# Patient Record
Sex: FEMALE | Race: WHITE | NOT HISPANIC OR LATINO | Employment: UNEMPLOYED | URBAN - METROPOLITAN AREA
[De-identification: names, ages, dates, MRNs, and addresses within clinical notes are randomized per-mention and may not be internally consistent; named-entity substitution may affect disease eponyms.]

---

## 2017-04-17 RX ORDER — TRAZODONE HYDROCHLORIDE 50 MG/1
50 TABLET ORAL NIGHTLY PRN
Qty: 90 TAB | Refills: 0 | Status: SHIPPED | OUTPATIENT
Start: 2017-04-17 | End: 2021-06-22

## 2017-04-17 NOTE — TELEPHONE ENCOUNTER
LOV 8/16. 3 month supply refilled. Please advise pt to make appt for follow-up and additional fills.

## 2017-06-30 ENCOUNTER — HOSPITAL ENCOUNTER (OUTPATIENT)
Dept: LAB | Facility: MEDICAL CENTER | Age: 52
End: 2017-06-30
Attending: PHYSICIAN ASSISTANT
Payer: OTHER GOVERNMENT

## 2017-06-30 LAB
25(OH)D3 SERPL-MCNC: 46 NG/ML (ref 30–100)
ALBUMIN SERPL BCP-MCNC: 3.8 G/DL (ref 3.2–4.9)
ALBUMIN/GLOB SERPL: 1.5 G/DL
ALP SERPL-CCNC: 56 U/L (ref 30–99)
ALT SERPL-CCNC: 42 U/L (ref 2–50)
ANION GAP SERPL CALC-SCNC: 6 MMOL/L (ref 0–11.9)
APPEARANCE UR: CLEAR
AST SERPL-CCNC: 26 U/L (ref 12–45)
BACTERIA #/AREA URNS HPF: NEGATIVE /HPF
BASOPHILS # BLD AUTO: 1 % (ref 0–1.8)
BASOPHILS # BLD: 0.06 K/UL (ref 0–0.12)
BILIRUB SERPL-MCNC: 0.3 MG/DL (ref 0.1–1.5)
BILIRUB UR QL STRIP.AUTO: NEGATIVE
BUN SERPL-MCNC: 17 MG/DL (ref 8–22)
CALCIUM SERPL-MCNC: 9.2 MG/DL (ref 8.5–10.5)
CHLORIDE SERPL-SCNC: 106 MMOL/L (ref 96–112)
CHOLEST SERPL-MCNC: 164 MG/DL (ref 100–199)
CO2 SERPL-SCNC: 28 MMOL/L (ref 20–33)
COLOR UR: YELLOW
CREAT SERPL-MCNC: 0.71 MG/DL (ref 0.5–1.4)
CULTURE IF INDICATED INDCX: YES UA CULTURE
EOSINOPHIL # BLD AUTO: 0.19 K/UL (ref 0–0.51)
EOSINOPHIL NFR BLD: 3.1 % (ref 0–6.9)
EPI CELLS #/AREA URNS HPF: NORMAL /HPF
ERYTHROCYTE [DISTWIDTH] IN BLOOD BY AUTOMATED COUNT: 48.6 FL (ref 35.9–50)
GFR SERPL CREATININE-BSD FRML MDRD: >60 ML/MIN/1.73 M 2
GLOBULIN SER CALC-MCNC: 2.6 G/DL (ref 1.9–3.5)
GLUCOSE SERPL-MCNC: 95 MG/DL (ref 65–99)
GLUCOSE UR STRIP.AUTO-MCNC: NEGATIVE MG/DL
HCT VFR BLD AUTO: 44.9 % (ref 37–47)
HDLC SERPL-MCNC: 61 MG/DL
HGB BLD-MCNC: 15 G/DL (ref 12–16)
HYALINE CASTS #/AREA URNS LPF: NORMAL /LPF
IMM GRANULOCYTES # BLD AUTO: 0 K/UL (ref 0–0.11)
IMM GRANULOCYTES NFR BLD AUTO: 0 % (ref 0–0.9)
KETONES UR STRIP.AUTO-MCNC: NEGATIVE MG/DL
LDLC SERPL CALC-MCNC: 79 MG/DL
LEUKOCYTE ESTERASE UR QL STRIP.AUTO: ABNORMAL
LYMPHOCYTES # BLD AUTO: 2.95 K/UL (ref 1–4.8)
LYMPHOCYTES NFR BLD: 47.5 % (ref 22–41)
MCH RBC QN AUTO: 33 PG (ref 27–33)
MCHC RBC AUTO-ENTMCNC: 33.4 G/DL (ref 33.6–35)
MCV RBC AUTO: 98.9 FL (ref 81.4–97.8)
MICRO URNS: ABNORMAL
MONOCYTES # BLD AUTO: 0.32 K/UL (ref 0–0.85)
MONOCYTES NFR BLD AUTO: 5.2 % (ref 0–13.4)
MUCOUS THREADS #/AREA URNS HPF: NORMAL /HPF
NEUTROPHILS # BLD AUTO: 2.69 K/UL (ref 2–7.15)
NEUTROPHILS NFR BLD: 43.2 % (ref 44–72)
NITRITE UR QL STRIP.AUTO: NEGATIVE
NRBC # BLD AUTO: 0 K/UL
NRBC BLD AUTO-RTO: 0 /100 WBC
PH UR STRIP.AUTO: 7 [PH]
PLATELET # BLD AUTO: 296 K/UL (ref 164–446)
PMV BLD AUTO: 10.7 FL (ref 9–12.9)
POTASSIUM SERPL-SCNC: 4.3 MMOL/L (ref 3.6–5.5)
PROT SERPL-MCNC: 6.4 G/DL (ref 6–8.2)
PROT UR QL STRIP: NEGATIVE MG/DL
RBC # BLD AUTO: 4.54 M/UL (ref 4.2–5.4)
RBC # URNS HPF: NORMAL /HPF
RBC UR QL AUTO: NEGATIVE
SODIUM SERPL-SCNC: 140 MMOL/L (ref 135–145)
SP GR UR STRIP.AUTO: 1.02
T3FREE SERPL-MCNC: 3.19 PG/ML (ref 2.4–4.2)
T4 FREE SERPL-MCNC: 0.71 NG/DL (ref 0.53–1.43)
TRIGL SERPL-MCNC: 121 MG/DL (ref 0–149)
TSH SERPL DL<=0.005 MIU/L-ACNC: 1.57 UIU/ML (ref 0.3–3.7)
WBC # BLD AUTO: 6.2 K/UL (ref 4.8–10.8)
WBC #/AREA URNS HPF: NORMAL /HPF

## 2017-06-30 PROCEDURE — 80053 COMPREHEN METABOLIC PANEL: CPT

## 2017-06-30 PROCEDURE — 84481 FREE ASSAY (FT-3): CPT

## 2017-06-30 PROCEDURE — 36415 COLL VENOUS BLD VENIPUNCTURE: CPT

## 2017-06-30 PROCEDURE — 81001 URINALYSIS AUTO W/SCOPE: CPT

## 2017-06-30 PROCEDURE — 84443 ASSAY THYROID STIM HORMONE: CPT

## 2017-06-30 PROCEDURE — 80061 LIPID PANEL: CPT

## 2017-06-30 PROCEDURE — 84439 ASSAY OF FREE THYROXINE: CPT

## 2017-06-30 PROCEDURE — 85025 COMPLETE CBC W/AUTO DIFF WBC: CPT

## 2017-06-30 PROCEDURE — 82306 VITAMIN D 25 HYDROXY: CPT

## 2017-06-30 PROCEDURE — 87086 URINE CULTURE/COLONY COUNT: CPT

## 2017-07-02 LAB
BACTERIA UR CULT: NORMAL
SIGNIFICANT IND 70042: NORMAL
SOURCE SOURCE: NORMAL

## 2018-10-17 ENCOUNTER — HOSPITAL ENCOUNTER (OUTPATIENT)
Dept: LAB | Facility: MEDICAL CENTER | Age: 53
End: 2018-10-17
Attending: NURSE PRACTITIONER
Payer: OTHER GOVERNMENT

## 2018-10-17 LAB
25(OH)D3 SERPL-MCNC: 62 NG/ML (ref 30–100)
ALBUMIN SERPL BCP-MCNC: 4.8 G/DL (ref 3.2–4.9)
ALBUMIN/GLOB SERPL: 1.8 G/DL
ALP SERPL-CCNC: 59 U/L (ref 30–99)
ALT SERPL-CCNC: 26 U/L (ref 2–50)
ANION GAP SERPL CALC-SCNC: 4 MMOL/L (ref 0–11.9)
AST SERPL-CCNC: 18 U/L (ref 12–45)
BASOPHILS # BLD AUTO: 0.9 % (ref 0–1.8)
BASOPHILS # BLD: 0.06 K/UL (ref 0–0.12)
BILIRUB SERPL-MCNC: 0.6 MG/DL (ref 0.1–1.5)
BUN SERPL-MCNC: 19 MG/DL (ref 8–22)
CALCIUM SERPL-MCNC: 9.8 MG/DL (ref 8.5–10.5)
CHLORIDE SERPL-SCNC: 103 MMOL/L (ref 96–112)
CHOLEST SERPL-MCNC: 147 MG/DL (ref 100–199)
CO2 SERPL-SCNC: 31 MMOL/L (ref 20–33)
CREAT SERPL-MCNC: 0.76 MG/DL (ref 0.5–1.4)
EOSINOPHIL # BLD AUTO: 0.07 K/UL (ref 0–0.51)
EOSINOPHIL NFR BLD: 1.1 % (ref 0–6.9)
ERYTHROCYTE [DISTWIDTH] IN BLOOD BY AUTOMATED COUNT: 47.3 FL (ref 35.9–50)
FOLATE SERPL-MCNC: >24 NG/ML
GLOBULIN SER CALC-MCNC: 2.7 G/DL (ref 1.9–3.5)
GLUCOSE SERPL-MCNC: 98 MG/DL (ref 65–99)
HCT VFR BLD AUTO: 45.6 % (ref 37–47)
HDLC SERPL-MCNC: 68 MG/DL
HGB BLD-MCNC: 15.6 G/DL (ref 12–16)
IMM GRANULOCYTES # BLD AUTO: 0.02 K/UL (ref 0–0.11)
IMM GRANULOCYTES NFR BLD AUTO: 0.3 % (ref 0–0.9)
IRON SERPL-MCNC: 104 UG/DL (ref 40–170)
LDLC SERPL CALC-MCNC: 63 MG/DL
LYMPHOCYTES # BLD AUTO: 2.07 K/UL (ref 1–4.8)
LYMPHOCYTES NFR BLD: 32.3 % (ref 22–41)
MCH RBC QN AUTO: 33.5 PG (ref 27–33)
MCHC RBC AUTO-ENTMCNC: 34.2 G/DL (ref 33.6–35)
MCV RBC AUTO: 97.9 FL (ref 81.4–97.8)
MONOCYTES # BLD AUTO: 0.35 K/UL (ref 0–0.85)
MONOCYTES NFR BLD AUTO: 5.5 % (ref 0–13.4)
NEUTROPHILS # BLD AUTO: 3.84 K/UL (ref 2–7.15)
NEUTROPHILS NFR BLD: 59.9 % (ref 44–72)
NRBC # BLD AUTO: 0 K/UL
NRBC BLD-RTO: 0 /100 WBC
PLATELET # BLD AUTO: 285 K/UL (ref 164–446)
PMV BLD AUTO: 10.8 FL (ref 9–12.9)
POTASSIUM SERPL-SCNC: 4.8 MMOL/L (ref 3.6–5.5)
PROT SERPL-MCNC: 7.5 G/DL (ref 6–8.2)
RBC # BLD AUTO: 4.66 M/UL (ref 4.2–5.4)
SODIUM SERPL-SCNC: 138 MMOL/L (ref 135–145)
T3FREE SERPL-MCNC: 3.27 PG/ML (ref 2.4–4.2)
T4 FREE SERPL-MCNC: 0.91 NG/DL (ref 0.53–1.43)
TRIGL SERPL-MCNC: 79 MG/DL (ref 0–149)
TSH SERPL DL<=0.005 MIU/L-ACNC: 0.84 UIU/ML (ref 0.38–5.33)
VIT B12 SERPL-MCNC: 1251 PG/ML (ref 211–911)
WBC # BLD AUTO: 6.4 K/UL (ref 4.8–10.8)

## 2018-10-17 PROCEDURE — 82607 VITAMIN B-12: CPT

## 2018-10-17 PROCEDURE — 82746 ASSAY OF FOLIC ACID SERUM: CPT

## 2018-10-17 PROCEDURE — 83540 ASSAY OF IRON: CPT

## 2018-10-17 PROCEDURE — 85025 COMPLETE CBC W/AUTO DIFF WBC: CPT

## 2018-10-17 PROCEDURE — 86800 THYROGLOBULIN ANTIBODY: CPT

## 2018-10-17 PROCEDURE — 84443 ASSAY THYROID STIM HORMONE: CPT

## 2018-10-17 PROCEDURE — 80053 COMPREHEN METABOLIC PANEL: CPT

## 2018-10-17 PROCEDURE — 82306 VITAMIN D 25 HYDROXY: CPT

## 2018-10-17 PROCEDURE — 80061 LIPID PANEL: CPT

## 2018-10-17 PROCEDURE — 84481 FREE ASSAY (FT-3): CPT

## 2018-10-17 PROCEDURE — 84439 ASSAY OF FREE THYROXINE: CPT

## 2018-10-17 PROCEDURE — 36415 COLL VENOUS BLD VENIPUNCTURE: CPT

## 2018-10-18 LAB — THYROGLOB AB SERPL-ACNC: <0.9 IU/ML (ref 0–4)

## 2021-06-15 ENCOUNTER — TELEPHONE (OUTPATIENT)
Dept: SCHEDULING | Facility: IMAGING CENTER | Age: 56
End: 2021-06-15

## 2021-06-16 ENCOUNTER — HOSPITAL ENCOUNTER (OUTPATIENT)
Dept: LAB | Facility: MEDICAL CENTER | Age: 56
End: 2021-06-16
Attending: NURSE PRACTITIONER
Payer: OTHER GOVERNMENT

## 2021-06-16 LAB
25(OH)D3 SERPL-MCNC: 43 NG/ML (ref 30–100)
FOLATE SERPL-MCNC: 26.1 NG/ML
PROGEST SERPL-MCNC: 0.37 NG/ML
VIT B12 SERPL-MCNC: 1068 PG/ML (ref 211–911)

## 2021-06-16 PROCEDURE — 82746 ASSAY OF FOLIC ACID SERUM: CPT

## 2021-06-16 PROCEDURE — 82607 VITAMIN B-12: CPT

## 2021-06-16 PROCEDURE — 84144 ASSAY OF PROGESTERONE: CPT

## 2021-06-16 PROCEDURE — 82670 ASSAY OF TOTAL ESTRADIOL: CPT

## 2021-06-16 PROCEDURE — 36415 COLL VENOUS BLD VENIPUNCTURE: CPT

## 2021-06-16 PROCEDURE — 82306 VITAMIN D 25 HYDROXY: CPT

## 2021-06-18 LAB — ESTRADIOL SERPL-MCNC: 36 PG/ML

## 2021-06-18 SDOH — ECONOMIC STABILITY: TRANSPORTATION INSECURITY
IN THE PAST 12 MONTHS, HAS THE LACK OF TRANSPORTATION KEPT YOU FROM MEDICAL APPOINTMENTS OR FROM GETTING MEDICATIONS?: NO

## 2021-06-18 SDOH — ECONOMIC STABILITY: FOOD INSECURITY: WITHIN THE PAST 12 MONTHS, YOU WORRIED THAT YOUR FOOD WOULD RUN OUT BEFORE YOU GOT MONEY TO BUY MORE.: NEVER TRUE

## 2021-06-18 SDOH — ECONOMIC STABILITY: HOUSING INSECURITY
IN THE LAST 12 MONTHS, WAS THERE A TIME WHEN YOU DID NOT HAVE A STEADY PLACE TO SLEEP OR SLEPT IN A SHELTER (INCLUDING NOW)?: NO

## 2021-06-18 SDOH — ECONOMIC STABILITY: HOUSING INSECURITY: IN THE LAST 12 MONTHS, HOW MANY PLACES HAVE YOU LIVED?: 1

## 2021-06-18 SDOH — ECONOMIC STABILITY: FOOD INSECURITY: WITHIN THE PAST 12 MONTHS, THE FOOD YOU BOUGHT JUST DIDN'T LAST AND YOU DIDN'T HAVE MONEY TO GET MORE.: NEVER TRUE

## 2021-06-18 SDOH — ECONOMIC STABILITY: INCOME INSECURITY: IN THE LAST 12 MONTHS, WAS THERE A TIME WHEN YOU WERE NOT ABLE TO PAY THE MORTGAGE OR RENT ON TIME?: NO

## 2021-06-18 SDOH — ECONOMIC STABILITY: TRANSPORTATION INSECURITY
IN THE PAST 12 MONTHS, HAS LACK OF TRANSPORTATION KEPT YOU FROM MEETINGS, WORK, OR FROM GETTING THINGS NEEDED FOR DAILY LIVING?: NO

## 2021-06-18 SDOH — HEALTH STABILITY: PHYSICAL HEALTH: ON AVERAGE, HOW MANY MINUTES DO YOU ENGAGE IN EXERCISE AT THIS LEVEL?: 30 MIN

## 2021-06-18 SDOH — ECONOMIC STABILITY: TRANSPORTATION INSECURITY
IN THE PAST 12 MONTHS, HAS LACK OF RELIABLE TRANSPORTATION KEPT YOU FROM MEDICAL APPOINTMENTS, MEETINGS, WORK OR FROM GETTING THINGS NEEDED FOR DAILY LIVING?: NO

## 2021-06-18 SDOH — HEALTH STABILITY: PHYSICAL HEALTH: ON AVERAGE, HOW MANY DAYS PER WEEK DO YOU ENGAGE IN MODERATE TO STRENUOUS EXERCISE (LIKE A BRISK WALK)?: 2 DAYS

## 2021-06-18 SDOH — ECONOMIC STABILITY: INCOME INSECURITY: HOW HARD IS IT FOR YOU TO PAY FOR THE VERY BASICS LIKE FOOD, HOUSING, MEDICAL CARE, AND HEATING?: NOT HARD AT ALL

## 2021-06-18 SDOH — HEALTH STABILITY: MENTAL HEALTH
STRESS IS WHEN SOMEONE FEELS TENSE, NERVOUS, ANXIOUS, OR CAN'T SLEEP AT NIGHT BECAUSE THEIR MIND IS TROUBLED. HOW STRESSED ARE YOU?: VERY MUCH

## 2021-06-18 ASSESSMENT — SOCIAL DETERMINANTS OF HEALTH (SDOH)
IN A TYPICAL WEEK, HOW MANY TIMES DO YOU TALK ON THE PHONE WITH FAMILY, FRIENDS, OR NEIGHBORS?: MORE THAN THREE TIMES A WEEK
HOW OFTEN DO YOU ATTEND CHURCH OR RELIGIOUS SERVICES?: NEVER
HOW OFTEN DO YOU HAVE SIX OR MORE DRINKS ON ONE OCCASION: NEVER
HOW OFTEN DO YOU ATTENT MEETINGS OF THE CLUB OR ORGANIZATION YOU BELONG TO?: NEVER
HOW OFTEN DO YOU GET TOGETHER WITH FRIENDS OR RELATIVES?: TWICE A WEEK
IN A TYPICAL WEEK, HOW MANY TIMES DO YOU TALK ON THE PHONE WITH FAMILY, FRIENDS, OR NEIGHBORS?: MORE THAN THREE TIMES A WEEK
DO YOU BELONG TO ANY CLUBS OR ORGANIZATIONS SUCH AS CHURCH GROUPS UNIONS, FRATERNAL OR ATHLETIC GROUPS, OR SCHOOL GROUPS?: NO
DO YOU BELONG TO ANY CLUBS OR ORGANIZATIONS SUCH AS CHURCH GROUPS UNIONS, FRATERNAL OR ATHLETIC GROUPS, OR SCHOOL GROUPS?: NO
WITHIN THE PAST 12 MONTHS, YOU WORRIED THAT YOUR FOOD WOULD RUN OUT BEFORE YOU GOT THE MONEY TO BUY MORE: NEVER TRUE
HOW HARD IS IT FOR YOU TO PAY FOR THE VERY BASICS LIKE FOOD, HOUSING, MEDICAL CARE, AND HEATING?: NOT HARD AT ALL
HOW OFTEN DO YOU ATTENT MEETINGS OF THE CLUB OR ORGANIZATION YOU BELONG TO?: NEVER
HOW OFTEN DO YOU HAVE A DRINK CONTAINING ALCOHOL: NEVER
HOW OFTEN DO YOU ATTEND CHURCH OR RELIGIOUS SERVICES?: NEVER
HOW OFTEN DO YOU GET TOGETHER WITH FRIENDS OR RELATIVES?: TWICE A WEEK

## 2021-06-18 ASSESSMENT — LIFESTYLE VARIABLES
HOW OFTEN DO YOU HAVE A DRINK CONTAINING ALCOHOL: NEVER
HOW OFTEN DO YOU HAVE SIX OR MORE DRINKS ON ONE OCCASION: NEVER

## 2021-06-22 ENCOUNTER — OFFICE VISIT (OUTPATIENT)
Dept: MEDICAL GROUP | Facility: PHYSICIAN GROUP | Age: 56
End: 2021-06-22
Payer: OTHER GOVERNMENT

## 2021-06-22 VITALS
HEIGHT: 66 IN | TEMPERATURE: 98.8 F | SYSTOLIC BLOOD PRESSURE: 150 MMHG | BODY MASS INDEX: 28.28 KG/M2 | OXYGEN SATURATION: 98 % | WEIGHT: 176 LBS | RESPIRATION RATE: 18 BRPM | DIASTOLIC BLOOD PRESSURE: 90 MMHG | HEART RATE: 88 BPM

## 2021-06-22 DIAGNOSIS — Z11.59 NEED FOR HEPATITIS C SCREENING TEST: ICD-10-CM

## 2021-06-22 DIAGNOSIS — F41.9 ANXIETY DISORDER, UNSPECIFIED TYPE: ICD-10-CM

## 2021-06-22 DIAGNOSIS — G43.019 INTRACTABLE MIGRAINE WITHOUT AURA AND WITHOUT STATUS MIGRAINOSUS: ICD-10-CM

## 2021-06-22 DIAGNOSIS — G89.4 CHRONIC PAIN SYNDROME: ICD-10-CM

## 2021-06-22 DIAGNOSIS — L40.9 PSORIASIS: ICD-10-CM

## 2021-06-22 DIAGNOSIS — Z00.00 WELLNESS EXAMINATION: ICD-10-CM

## 2021-06-22 DIAGNOSIS — Z12.83 SKIN CANCER SCREENING: ICD-10-CM

## 2021-06-22 DIAGNOSIS — E78.00 HIGH CHOLESTEROL: ICD-10-CM

## 2021-06-22 PROCEDURE — 99204 OFFICE O/P NEW MOD 45 MIN: CPT | Performed by: NURSE PRACTITIONER

## 2021-06-22 RX ORDER — ALPRAZOLAM 0.5 MG/1
0.5 TABLET ORAL NIGHTLY PRN
COMMUNITY
End: 2021-06-22 | Stop reason: SDUPTHER

## 2021-06-22 RX ORDER — ACYCLOVIR 800 MG/1
TABLET ORAL
COMMUNITY
Start: 2021-06-15 | End: 2021-12-30 | Stop reason: SDUPTHER

## 2021-06-22 RX ORDER — FREMANEZUMAB-VFRM 225 MG/1.5ML
225 INJECTION SUBCUTANEOUS ONCE
COMMUNITY
End: 2021-07-06

## 2021-06-22 RX ORDER — ALPRAZOLAM 0.5 MG/1
0.5 TABLET ORAL NIGHTLY PRN
Qty: 30 TABLET | Refills: 0 | Status: SHIPPED | OUTPATIENT
Start: 2021-06-22 | End: 2021-07-22

## 2021-06-22 RX ORDER — ESTRADIOL 0.1 MG/D
FILM, EXTENDED RELEASE TRANSDERMAL
COMMUNITY
Start: 2021-04-21 | End: 2021-07-06

## 2021-06-22 RX ORDER — BUPRENORPHINE HYDROCHLORIDE 600 UG/1
900 FILM, SOLUBLE BUCCAL
COMMUNITY
End: 2024-02-22

## 2021-06-22 RX ORDER — CYCLOBENZAPRINE HCL 10 MG
TABLET ORAL
COMMUNITY
Start: 2021-05-03 | End: 2023-11-01

## 2021-06-22 ASSESSMENT — PATIENT HEALTH QUESTIONNAIRE - PHQ9: CLINICAL INTERPRETATION OF PHQ2 SCORE: 0

## 2021-06-22 NOTE — ASSESSMENT & PLAN NOTE
"Chronic, stable.  Currently taking Crestor 10 mg daily as directed.  Denies side effects of the medication--no myalgias or abdominal pain.  Reports diet is \"okay\".   She is following a low-cholesterol diet.  She is exercising regularly.  She is not taking ASA daily.  She denies dizziness, claudication, or chest pain.  Due for updated lab work.  "
Chronic and ongoing. She has Fluocinonide cream to help as needed. She mostly gets flare up on her hands/palms.  
Chronic and ongoing. She is currently taking Ajovy injections once a month. She states that it has been helping. She would like to see a Neurologist.  
Chronic and ongoing. She is taking Alprazolam 0.5 mg nightly as needed. She states that she has been off of medication for a little while. She states that she has noticed her anxiety increasing. She is requesting a refill today.  
Chronic and ongoing. She states that she gets pain all over. She has joint pain and migraines. She is currently taking Oxycodone 10 mg every 6 hours as needed. She states that the medication is helping some. She does see a pain specialist that gives her this prescription.  
She would like a referral to Dermatology for her annual skin cancer screening.  
-Please follow-up with your pediatrician in one to two days after discharge.  -Continue with previously prescribed amoxicillin as noted by private pediatrician.  -Continue with azithromycin initiated in hospital as prescribed.  -Please call your pediatrician sooner, or come to the ED if symptoms do not respond to medication or worsen, as well as for any concerns.

## 2021-06-22 NOTE — PROGRESS NOTES
"Subjective  Chief Complaint  Establish care to manage her chronic conditions    History of Present Illness  Christelle Kennedy is a 56 y.o. female. This patient is here today to establish care.    Anxiety disorder, unspecified  Chronic and ongoing. She is taking Alprazolam 0.5 mg nightly as needed. She states that she has been off of medication for a little while. She states that she has noticed her anxiety increasing. She is requesting a refill today.    Chronic pain syndrome  Chronic and ongoing. She states that she gets pain all over. She has joint pain and migraines. She is currently taking Oxycodone 10 mg every 6 hours as needed. She states that the medication is helping some. She does see a pain specialist that gives her this prescription.    Migraine without aura  Chronic and ongoing. She is currently taking Ajovy injections once a month. She states that it has been helping. She would like to see a Neurologist.    Psoriasis  Chronic and ongoing. She has Fluocinonide cream to help as needed. She mostly gets flare up on her hands/palms.    High cholesterol  Chronic, stable.  Currently taking Crestor 10 mg daily as directed.  Denies side effects of the medication--no myalgias or abdominal pain.  Reports diet is \"okay\".   She is following a low-cholesterol diet.  She is exercising regularly.  She is not taking ASA daily.  She denies dizziness, claudication, or chest pain.  Due for updated lab work.    Skin cancer screening  She would like a referral to Dermatology for her annual skin cancer screening.    Past Medical History    Allergies: Tetracycline  Past Medical History:   Diagnosis Date   • Allergy    • Anxiety    • Arthritis    • Cancer (HCC)     Cervical   • Depression    • Headache(784.0)    • Hyperlipidemia    • Hypertension    • Migraine    • Migraine without aura, without mention of intractable migraine without mention of status migrainosus    • Psoriasis    • Urinary tract infection, site not specified "      Past Surgical History:   Procedure Laterality Date   • CHOLECYSTECTOMY  2001   • RHINOPLASTY  1999   • ABDOMINAL SUBTOTAL HYSTERECTOMY  1996    dysplasia   • TONSILLECTOMY       Current Outpatient Medications Ordered in Epic   Medication Sig Dispense Refill   • acyclovir (ZOVIRAX) 800 MG Tab      • cyclobenzaprine (FLEXERIL) 10 mg Tab TAKE 1 TABLET BY MOUTH EVERY DAY AS NEEDED FOR FLARES     • estradiol (VIVELLE DOT) 0.1 MG/24HR PATCH BIWEEKLY      • Buprenorphine HCl (BELBUCA) 600 MCG FILM Place  into mouth between cheek and gum.     • Fremanezumab-vfrm (AJOVY) 225 MG/1.5ML Solution Prefilled Syringe Inject 225 mg under the skin one time.     • ALPRAZolam (XANAX) 0.5 MG Tab Take 1 tablet by mouth at bedtime as needed for Anxiety for up to 30 days. 30 tablet 0   • oxycodone immediate release (ROXICODONE) 10 MG immediate release tablet Take 1 Tab by mouth every 6 hours as needed for Moderate Pain. 120 Tab 0   • CRESTOR 10 MG TABS TAKE 1 TABLET EVERY EVENING 90 Tab 4     No current Epic-ordered facility-administered medications on file.     Family History:    Family History   Problem Relation Age of Onset   • Diabetes Mother    • Hypertension Mother    • Cancer Mother 62        breast   • Hyperlipidemia Mother    • Heart Disease Father    • Stroke Paternal Grandmother 82   • Hypertension Sister    • Thyroid Sister    • Lung Disease Neg Hx    • Alcohol/Drug Neg Hx       Personal/Social History:    Social History     Tobacco Use   • Smoking status: Current Every Day Smoker     Packs/day: 0.50     Years: 40.00     Pack years: 20.00     Types: Cigarettes   • Smokeless tobacco: Never Used   • Tobacco comment: encouraged to quit, tried quitting   Vaping Use   • Vaping Use: Never used   Substance Use Topics   • Alcohol use: No     Alcohol/week: 0.0 oz     Comment: occasion 4 x a yr   • Drug use: No     Comment: tired mj in past.     Social History     Social History Narrative   • Not on file      Review of Systems:      "General: Negative for fever/chills and unexpected weight change.    Eyes:  Negative for vision changes, eye pain.   ENT:  Negative for hearing changes, ear pain, congestion, sore throat, and neck pain.    Respiratory:  Negative for cough and dyspnea.     Cardiovascular:  Negative for chest pain and palpitations.   Gastrointestinal:  Negative for nausea/vomiting, changes in bowel habits, and abdominal pain.    Genitourinary:  Negative for dysuria and hematuria.    Musculoskeletal:  Negative for myalgias.    Skin:  Negative for rash.    Neurological:  Negative for numbness/tingling and headaches.    Heme/Lymph:  Does not bruise/bleed easily.     Objective  Physical Exam:   /90 (BP Location: Right arm, Patient Position: Sitting, BP Cuff Size: Adult)   Pulse 88   Temp 37.1 °C (98.8 °F) (Temporal)   Resp 18   Ht 1.676 m (5' 6\")   Wt 79.8 kg (176 lb)   SpO2 98%  Body mass index is 28.41 kg/m².  General:  Alert and oriented.  Well appearing.  NAD.  Head:  Normocephalic.   Eyes:  Eyes conjunctiva clear lids without ptosis, pupils equal and reactive to light accommodation.    ENT: Ears normal shape and contour, canals are clear bilaterally, tympanic membranes are benign.  Oropharynx is without erythema, edema or exudates.   Neck: Supple without JVD. No lymphadenopathy.  Pulmonary:  Normal effort.  Clear to ausculation without rales, ronchi, or wheezing.  Cardiovascular:  Regular rate and rhythm without murmur, rubs or gallop.  Radial pulses are intact and equal bilaterally.  Musculoskeletal:  No extremity cyanosis, clubbing, or edema.  Skin:  Warm and dry.  No obvious lesions.  Psych: Normal mood and affect. Alert and oriented x3. Judgment and insight is normal.      Assessment/Plan   1. Anxiety disorder, unspecified type  Chronic and ongoing.  Continue to take Alprazolam 0.5 mg as needed at bedtime.  - ALPRAZolam (XANAX) 0.5 MG Tab; Take 1 tablet by mouth at bedtime as needed for Anxiety for up to 30 days.  " Dispense: 30 tablet; Refill: 0    Obtained and reviewed patient utilization report from [facility] pharmacy database on 6/22/2021 at 0914 prior to writing prescription for controlled substance II, III or IV per [place] bill . Based on assessment of the report, the prescription is medically necessary.      2. Chronic pain syndrome  Chronic and ongoing.  Continue to take Oxycodone 10 mg every 6 hours as needed.  Continue to follow up with pain management specialist.    3. Intractable migraine without aura and without status migrainosus  Chronic and ongoing.  Continue to use Ajovy injection monthly.  She would like a referral to Neurology, referral placed at this time.  - REFERRAL TO NEUROLOGY    4. Psoriasis  Chronic and ongoing  Continue to use Fluocinonide cream as needed.    5. High cholesterol  Chronic and stable.  Continue to take Crestor 10 mg daily.  Educated on a healthy diet and exercise.  Due for updated labs.  - Lipid Profile; Future    6. Wellness examination  Due for updated labs.  - Comp Metabolic Panel; Future  - FREE THYROXINE; Future  - TSH WITH REFLEX TO FT4; Future    7. Skin cancer screening  She would like a referral to Dermatology for an annual skin cancer screening.  - REFERRAL TO DERMATOLOGY    8. Need for hepatitis C screening test  - HEP C VIRUS ANTIBODY; Future      Health Maintenance: Completed    Return in about 1 year (around 6/22/2022), or if symptoms worsen or fail to improve.    Please note that this dictation was created using voice recognition software. I have made every reasonable attempt to correct obvious errors, but I expect that there are errors of grammar and possibly content that I did not discover before finalizing the note.    CHRISTINA Rivero  [facility]

## 2021-07-06 ENCOUNTER — OFFICE VISIT (OUTPATIENT)
Dept: NEUROLOGY | Facility: MEDICAL CENTER | Age: 56
End: 2021-07-06
Attending: PSYCHIATRY & NEUROLOGY
Payer: OTHER GOVERNMENT

## 2021-07-06 VITALS
SYSTOLIC BLOOD PRESSURE: 140 MMHG | OXYGEN SATURATION: 95 % | BODY MASS INDEX: 28.98 KG/M2 | HEART RATE: 80 BPM | WEIGHT: 180.34 LBS | HEIGHT: 66 IN | DIASTOLIC BLOOD PRESSURE: 80 MMHG | TEMPERATURE: 97.5 F

## 2021-07-06 DIAGNOSIS — K14.6 BURNING MOUTH SYNDROME: ICD-10-CM

## 2021-07-06 PROCEDURE — 99212 OFFICE O/P EST SF 10 MIN: CPT | Performed by: PSYCHIATRY & NEUROLOGY

## 2021-07-06 PROCEDURE — 99205 OFFICE O/P NEW HI 60 MIN: CPT | Performed by: PSYCHIATRY & NEUROLOGY

## 2021-07-06 RX ORDER — ERENUMAB-AOOE 70 MG/ML
70 INJECTION SUBCUTANEOUS ONCE
COMMUNITY
End: 2021-10-05

## 2021-07-06 RX ORDER — ESTRADIOL 2 MG/1
TABLET ORAL
COMMUNITY
Start: 2021-06-24 | End: 2022-04-07 | Stop reason: SDUPTHER

## 2021-07-07 NOTE — PROGRESS NOTES
"Reason for Consult:  Burning sensation of the palate (top) and tongue for nearly 1 year.    History of present illness:    Christelle Kennedy 56 y.o. right handed woman with migraine with aura for over 40 years and who has been treated with Ajovy for 1 year and most now is on Aimovig for migraine prevention ([facility] for which she has gone there for 2-3 years).    About August 2020 she noticed a sensitivity of her teeth (the left upper molars) and then to the right upper molars with similar sensitivity feelings  lasting a few weeks together  and then going away but evolving right into a burning sensation (specifically on the roof of her mouth and distal bilateral tongue sparing the gums, throat or any areas on the outside of her face or scalp). If she is eating or drinking any substances, the burning is not present.  She describes the distal tongue as feeling \"raw and burning \" on the dorsal tongue  The symptoms have been stable for nearly 1 year.  She had previously been on Xanax for years (0.5 mg QHS) and then went off the Xanax in August 2020 and then returned to this medication at the same 2 weeks ago>> she is sleeping better as before she had to drink     She recalls that at 1st with the above symptoms- there was a metallic taste in her mouth (or in the saliva)- this metallic taste was persisting for atleast 1 week if not longer. She does feel that she has lost some taste.    No change in subjective  taste in the last 6 months and general sweat,sour,bitter and salt modalities are the same to her as before the above began.     No change in the color of her tongue in the recent months.    No difficulty swallowing or pain when swallowing in the last 3-6 months.    She even went off all her medication(s) for 1-2 months as a trial to see if this made any difference.    Patient Active Problem List    Diagnosis Date Noted   • Skin cancer screening 06/22/2021   • Chronic pain syndrome 08/05/2016   • Sleep " disorder 08/05/2016   • Anxiety disorder, unspecified 08/05/2016   • Opiate dependence, continuous ([facility]) 02/16/2016   • Long term prescription opiate use 02/16/2016   • Long term prescription benzodiazepine use 02/16/2016   • Migraine without aura    • Psoriasis 06/12/2014   • Vitamin d deficiency 12/18/2012   • Neuropathic pain of hand 07/23/2012   • Neuropathy 07/17/2012   • High cholesterol 06/25/2011   • Joint pain 06/25/2011       Past medical history:   Past Medical History:   Diagnosis Date   • Allergy    • Anxiety    • Arthritis    • Cancer (HCC)     Cervical   • Depression    • Headache(784.0)    • Hyperlipidemia    • Hypertension    • Migraine    • Migraine without aura, without mention of intractable migraine without mention of status migrainosus    • Psoriasis    • Urinary tract infection, site not specified        Past surgical history:   Past Surgical History:   Procedure Laterality Date   • CHOLECYSTECTOMY  2001   • RHINOPLASTY  1999   • ABDOMINAL SUBTOTAL HYSTERECTOMY  1996    dysplasia   • TONSILLECTOMY           Social history:   Social History     Socioeconomic History   • Marital status:      Spouse name: Not on file   • Number of children: Not on file   • Years of education: Not on file   • Highest education level: GED or equivalent   Occupational History   • Not on file   Tobacco Use   • Smoking status: Current Every Day Smoker     Packs/day: 0.50     Years: 40.00     Pack years: 20.00     Types: Cigarettes   • Smokeless tobacco: Never Used   • Tobacco comment: encouraged to quit, tried quitting   Vaping Use   • Vaping Use: Never used   Substance and Sexual Activity   • Alcohol use: No     Alcohol/week: 0.0 oz     Comment: occasion 4 x a yr   • Drug use: No     Comment: tired mj in past.   • Sexual activity: Yes     Partners: Male     Birth control/protection: Surgical   Other Topics Concern   • Not on file   Social History Narrative   • Not on file     Social Determinants of Health      Financial Resource Strain: Low Risk    • Difficulty of Paying Living Expenses: Not hard at all   Food Insecurity: No Food Insecurity   • Worried About Running Out of Food in the Last Year: Never true   • Ran Out of Food in the Last Year: Never true   Transportation Needs: No Transportation Needs   • Lack of Transportation (Medical): No   • Lack of Transportation (Non-Medical): No   Physical Activity: Insufficiently Active   • Days of Exercise per Week: 2 days   • Minutes of Exercise per Session: 30 min   Stress: Stress Concern Present   • Feeling of Stress : Very much   Social Connections: Moderately Isolated   • Frequency of Communication with Friends and Family: More than three times a week   • Frequency of Social Gatherings with Friends and Family: Twice a week   • Attends Church Services: Never   • Active Member of Clubs or Organizations: No   • Attends Club or Organization Meetings: Never   • Marital Status:    Intimate Partner Violence:    • Fear of Current or Ex-Partner:    • Emotionally Abused:    • Physically Abused:    • Sexually Abused:        Family history:   Family History   Problem Relation Age of Onset   • Diabetes Mother    • Hypertension Mother    • Cancer Mother 62        breast   • Hyperlipidemia Mother    • Heart Disease Father    • Stroke Paternal Grandmother 82   • Hypertension Sister    • Thyroid Sister    • Lung Disease Neg Hx    • Alcohol/Drug Neg Hx          Current medications:   Current Outpatient Medications   Medication   • Erenumab (AIMOVIG) 70 MG/ML Solution Auto-injector   • acyclovir (ZOVIRAX) 800 MG Tab   • cyclobenzaprine (FLEXERIL) 10 mg Tab   • Buprenorphine HCl (BELBUCA) 600 MCG FILM   • ALPRAZolam (XANAX) 0.5 MG Tab   • oxycodone immediate release (ROXICODONE) 10 MG immediate release tablet   • CRESTOR 10 MG TABS   • estradiol (VIVELLE DOT) 0.1 MG/24HR PATCH BIWEEKLY   • Fremanezumab-vfrm (AJOVY) 225 MG/1.5ML Solution Prefilled Syringe     No current  "facility-administered medications for this visit.       Medication Allergy:  Allergies   Allergen Reactions   • Latex    • Tetracycline Rash                   Physical examination:   Vitals:    07/06/21 1647   BP: 140/80   BP Location: Right arm   Pulse: 80   Temp: 36.4 °C (97.5 °F)   TempSrc: Temporal   SpO2: 95%   Weight: 81.8 kg (180 lb 5.4 oz)   Height: 1.676 m (5' 6\")       Normal cephalic atraumatic.  Oral cavity looks normal- no ulcers on tongue or palate.  Tongue of normal contour and color.  There is or was no pain or discomfort when she took her own tongue and touched any of her teeth or the room of her mouth.     She had full  range of movement around the Neck in all directions without restrictions or discrete pain evoked triggers.    No lower extremity edema.      Neurological  Exam:        Mental status: Awake, alert and fully oriented to person, place, time and situation. Normal attention, concentration and fund of knowledge for education level.  Did not appear/act combative,irritable,anxious,paranoid/delusional or aggressive to or with me.  Speech and language: Speech is fluent without errors, clear, intact to repetition and intact to naming.     Follows 3 step motor commands in sequence without significant delay and correctly.    Cranial nerve exam:  II: Pupils are equally round and reactive to light. Visual fields are intact by confrontation.  III, IV, VI: EOMI, no diplopia, no ptosis.  V: Sensation to light touch is normal over V1-3 distributions bilaterally.  .  VII: Facial movements are symmetrical. There is no facial droop. .  VIII: Hearing intact to soft speech and finger rub bilaterally  IX: Palate elevates symmetrically, uvula is midline. Dysarthria is not present.  XI: Shoulder shrug are symmetrical and strong.   XII: Tongue protrudes midline.        Motor exam:  Muscle tone is normal in all 4 limbs. and No abnormal movements appreciated.    Muscle strength:    Neck Flexors/Extensors: " "5/5       Right  Left  Deltoid   5/5  5/5      Biceps   5/5  5/5  Triceps  5/5  5/5   Wrist extensors 5/5  5/5  Wrist flexors  5/5  5/5     5/5  5/5  Interossei  5/5  5/5  Thenar (APB)  5/5  5/5   Hip flexors  5/5  5/5  Quadriceps  5/5  5/5    Hamstrings  5/5  5/5  Dorsiflexors  5/5  5/5  Plantarflexors  5/5  5/5  Toe extension  5/5  5/5  NT = not tested    Sensory exam:  Intact to Vibration and Proprioception in bilaterally lower extremity.      Reflexes:       Right  Left  Biceps   2/4  2/4  Triceps  2/4  2/4  Brachioradialis 2/4  2/4  Knee jerk  2/4  2/4  Ankle jerk  2/4  2/4     Frontal release signs are absent.    bilaterally toes are downgoing to plantar stimulation..    Coordination (finger-to-nose, heel/knee/shin, rapid alternating movements) was normal.     There was no truncal ataxia, no tremors, and no dysmetria.     Station and gait - easily stands up from exam chair without retropulsion,veering,leaning,swaying (to either side).   Arm swing symmetrical.    No Rombergism.      Labs and Tests: reviewed from 2021 (folate,b12)- wnl.      NEUROIMAGIN2020: CT Neck (soft tissue with contrast)- [facility]> \"No mass or pathological adenopathy\"    Brain MRI- - no specific abnormality.    Blood work in 2020: Sjogren's Antibodies- SSA/SSB- wnl ([facility])  KIAN- negative  Ferritin- 70 (wnl).  LH,TSH,FSH: wnl  ESR-5   Cbc: wnl      Impression/Plans/Recommendations:    1. Burning Mouth Syndrome (chronic)    There are no features of a more diffuse polyneuropathy condition or other specific cranial neuropathies.    Many symptoms of this including involvement of palate and distal tongue and sense of dryness with prior persisting metallic taste in mouth and dry mouth.    Extensive review of her history does not support a drug effect nor does she have any history of diabetes,thyroid dz or other autoimmune " disorders.    [email or web address]    I do not feel a Brain MRI or facial nerve (5th cranial nerve)- electrodiagnostic testing will be helpful in this clinical situation.    We reviewed the above condition and potential remedies which can include lavender oil, Aloe Vera, Gabapentin.    I also suggested if she wanted a sub-sub specialty opinion that the [facility] is well known for doing research in this type of condition but at this time she did not want to pursue that avenue.    Will be checking several Vitamin B levels (B1,B2,B6 as well as blood  zinc and niacin levels).    2. Chronic Episodic Migraine without aura - only 2 visual aura(s) in entire life.    On Emgality recently started as not able to tolerate Ajovy previously.    We discussed factors in migraine management today and I suggested several educational resources regarding the migraine syndrome in terms of patient care related books from well known migraine specialists.      I have performed  a history and physical exam and a directed /focused  ROS today.    Total time spent today or this patient's care was 65   minutes  and included reviewing the diagnostic workup to date (labs and imaging that include interpreting such tests relevant to this patient's neurological condition),  reviewing/obtaining separately obtained history (from patient and/or accompanying friend/family member/caregiver)  for today's neurological problem(s) and  counseling and educating Christelle about mouth and tongue pain syndromes and documenting clinical information in the EMR.    Follow up PRN at this time.    Luis Miguel Sánchez MD  [facility].   [facility]  Office: [phone]  Fax: [phone]

## 2021-07-22 DIAGNOSIS — F41.9 ANXIETY DISORDER, UNSPECIFIED TYPE: ICD-10-CM

## 2021-07-22 RX ORDER — ALPRAZOLAM 0.5 MG/1
0.5 TABLET ORAL NIGHTLY PRN
Qty: 30 TABLET | Refills: 0 | Status: SHIPPED | OUTPATIENT
Start: 2021-07-22 | End: 2021-08-24

## 2021-07-22 NOTE — TELEPHONE ENCOUNTER
Requested Prescriptions     Pending Prescriptions Disp Refills   • ALPRAZolam (XANAX) 0.5 MG Tab [Pharmacy Med Name: ALPRAZOLAM 0.5MG TABLETS] 30 tablet 0     Sig: Take 1 tablet by mouth at bedtime as needed for Anxiety for up to 30 days.

## 2021-07-29 ENCOUNTER — HOSPITAL ENCOUNTER (OUTPATIENT)
Dept: LAB | Facility: MEDICAL CENTER | Age: 56
End: 2021-07-29
Attending: PSYCHIATRY & NEUROLOGY
Payer: OTHER GOVERNMENT

## 2021-07-29 ENCOUNTER — HOSPITAL ENCOUNTER (OUTPATIENT)
Dept: LAB | Facility: MEDICAL CENTER | Age: 56
End: 2021-07-29
Attending: NURSE PRACTITIONER
Payer: OTHER GOVERNMENT

## 2021-07-29 DIAGNOSIS — K14.6 BURNING MOUTH SYNDROME: ICD-10-CM

## 2021-07-29 DIAGNOSIS — Z00.00 WELLNESS EXAMINATION: ICD-10-CM

## 2021-07-29 DIAGNOSIS — E78.00 HIGH CHOLESTEROL: ICD-10-CM

## 2021-07-29 DIAGNOSIS — Z11.59 NEED FOR HEPATITIS C SCREENING TEST: ICD-10-CM

## 2021-07-29 LAB
ALBUMIN SERPL BCP-MCNC: 4.6 G/DL (ref 3.2–4.9)
ALBUMIN/GLOB SERPL: 1.6 G/DL
ALP SERPL-CCNC: 61 U/L (ref 30–99)
ALT SERPL-CCNC: 14 U/L (ref 2–50)
ANION GAP SERPL CALC-SCNC: 10 MMOL/L (ref 7–16)
AST SERPL-CCNC: 19 U/L (ref 12–45)
BILIRUB SERPL-MCNC: 0.5 MG/DL (ref 0.1–1.5)
BUN SERPL-MCNC: 10 MG/DL (ref 8–22)
CALCIUM SERPL-MCNC: 9.3 MG/DL (ref 8.5–10.5)
CHLORIDE SERPL-SCNC: 100 MMOL/L (ref 96–112)
CHOLEST SERPL-MCNC: 233 MG/DL (ref 100–199)
CO2 SERPL-SCNC: 28 MMOL/L (ref 20–33)
CREAT SERPL-MCNC: 0.65 MG/DL (ref 0.5–1.4)
GLOBULIN SER CALC-MCNC: 2.8 G/DL (ref 1.9–3.5)
GLUCOSE SERPL-MCNC: 95 MG/DL (ref 65–99)
HCV AB SER QL: NORMAL
HDLC SERPL-MCNC: 68 MG/DL
LDLC SERPL CALC-MCNC: 138 MG/DL
POTASSIUM SERPL-SCNC: 4 MMOL/L (ref 3.6–5.5)
PROT SERPL-MCNC: 7.4 G/DL (ref 6–8.2)
SODIUM SERPL-SCNC: 138 MMOL/L (ref 135–145)
T4 FREE SERPL-MCNC: 1.16 NG/DL (ref 0.93–1.7)
TRIGL SERPL-MCNC: 136 MG/DL (ref 0–149)
TSH SERPL DL<=0.005 MIU/L-ACNC: 1.51 UIU/ML (ref 0.38–5.33)

## 2021-07-29 PROCEDURE — 84439 ASSAY OF FREE THYROXINE: CPT

## 2021-07-29 PROCEDURE — 84443 ASSAY THYROID STIM HORMONE: CPT

## 2021-07-29 PROCEDURE — 36415 COLL VENOUS BLD VENIPUNCTURE: CPT

## 2021-07-29 PROCEDURE — 84207 ASSAY OF VITAMIN B-6: CPT

## 2021-07-29 PROCEDURE — 80053 COMPREHEN METABOLIC PANEL: CPT

## 2021-07-29 PROCEDURE — 84252 ASSAY OF VITAMIN B-2: CPT

## 2021-07-29 PROCEDURE — 86803 HEPATITIS C AB TEST: CPT

## 2021-07-29 PROCEDURE — 84591 ASSAY OF NOS VITAMIN: CPT

## 2021-07-29 PROCEDURE — 84630 ASSAY OF ZINC: CPT

## 2021-07-29 PROCEDURE — 80061 LIPID PANEL: CPT

## 2021-07-30 ENCOUNTER — PATIENT MESSAGE (OUTPATIENT)
Dept: MEDICAL GROUP | Facility: PHYSICIAN GROUP | Age: 56
End: 2021-07-30

## 2021-07-30 DIAGNOSIS — E78.49 OTHER HYPERLIPIDEMIA: ICD-10-CM

## 2021-07-30 RX ORDER — ROSUVASTATIN CALCIUM 10 MG/1
TABLET, COATED ORAL
Qty: 90 TABLET | Refills: 3 | Status: SHIPPED | OUTPATIENT
Start: 2021-07-30 | End: 2022-05-03 | Stop reason: SDUPTHER

## 2021-07-30 NOTE — TELEPHONE ENCOUNTER
Requested Prescriptions     Pending Prescriptions Disp Refills   • rosuvastatin (CRESTOR) 10 MG Tab 90 tablet 3     Sig: TAKE 1 TABLET EVERY EVENING

## 2021-08-02 LAB — VIT B6 SERPL-MCNC: 51 NMOL/L (ref 20–125)

## 2021-08-03 LAB
VIT B2 SERPL-SCNC: 36 NMOL/L (ref 5–50)
ZINC SERPL-MCNC: 96.3 UG/DL (ref 60–120)

## 2021-08-09 LAB — NIACIN SERPL-MCNC: 2.28 UG/ML (ref 0.5–8.45)

## 2021-08-19 DIAGNOSIS — F41.9 ANXIETY DISORDER, UNSPECIFIED TYPE: ICD-10-CM

## 2021-08-23 DIAGNOSIS — F41.9 ANXIETY DISORDER, UNSPECIFIED TYPE: ICD-10-CM

## 2021-08-24 ENCOUNTER — DOCUMENTATION (OUTPATIENT)
Dept: NEUROLOGY | Facility: MEDICAL CENTER | Age: 56
End: 2021-08-24

## 2021-08-24 DIAGNOSIS — K14.6 BURNING MOUTH SYNDROME: ICD-10-CM

## 2021-08-24 DIAGNOSIS — F41.9 ANXIETY DISORDER, UNSPECIFIED TYPE: ICD-10-CM

## 2021-08-24 RX ORDER — ALPRAZOLAM 0.5 MG/1
.5-1 TABLET ORAL NIGHTLY PRN
Qty: 60 TABLET | Refills: 0 | Status: SHIPPED | OUTPATIENT
Start: 2021-08-24 | End: 2021-10-01 | Stop reason: SDUPTHER

## 2021-08-24 RX ORDER — ALPRAZOLAM 0.5 MG/1
1 TABLET ORAL NIGHTLY PRN
Qty: 30 TABLET | Refills: 0 | Status: SHIPPED | OUTPATIENT
Start: 2021-08-24 | End: 2021-08-24 | Stop reason: SDUPTHER

## 2021-08-24 RX ORDER — ALPRAZOLAM 0.5 MG/1
0.5 TABLET ORAL NIGHTLY PRN
Qty: 30 TABLET | Refills: 0 | OUTPATIENT
Start: 2021-08-24 | End: 2021-09-23

## 2021-08-24 NOTE — TELEPHONE ENCOUNTER
Requested Prescriptions     Pending Prescriptions Disp Refills   • ALPRAZolam (XANAX) 0.5 MG Tab [Pharmacy Med Name: ALPRAZOLAM 0.5MG TABLETS] 30 Tablet 0     Sig: Take 2 Tablets by mouth at bedtime as needed for Sleep for up to 30 days.

## 2021-08-24 NOTE — PROGRESS NOTES
1. Anxiety disorder, unspecified type  - ALPRAZolam (XANAX) 0.5 MG Tab; Take 1-2 Tablets by mouth at bedtime as needed for Anxiety for up to 30 days.  Dispense: 60 Tablet; Refill: 0

## 2021-08-24 NOTE — PROGRESS NOTES
Patient with refractors Burning Mouth Syndrome.    I will be referring Christelle to see Dr. Nesha Gmoez DDS ([place]) for this issue and for her opinion.

## 2021-09-08 ENCOUNTER — TELEPHONE (OUTPATIENT)
Dept: MEDICAL GROUP | Facility: PHYSICIAN GROUP | Age: 56
End: 2021-09-08

## 2021-09-08 DIAGNOSIS — F13.180: ICD-10-CM

## 2021-09-08 DIAGNOSIS — Z79.891 LONG TERM PRESCRIPTION OPIATE USE: ICD-10-CM

## 2021-09-08 DIAGNOSIS — F11.20 OPIOID TYPE DEPENDENCE, CONTINUOUS (HCC): ICD-10-CM

## 2021-09-08 DIAGNOSIS — F13.10 SEDATIVE, HYPNOTIC OR ANXIOLYTIC ABUSE, CONTINUOUS (HCC): ICD-10-CM

## 2021-09-08 DIAGNOSIS — G89.4 CHRONIC PAIN SYNDROME: ICD-10-CM

## 2021-09-09 NOTE — TELEPHONE ENCOUNTER
1. Caller Name: Patient                         Call Back Number: [phone] (home)         How would the patient prefer to be contacted with a response: Yappehart message    2. SPECIFIC Action To Be Taken: Referral pending, please sign.    3. Diagnosis/Clinical Reason for Request:      4. Specialty & Provider Name/Lab/Imaging Location:     5. Is appointment scheduled for requested order/referral: no    Patient was informed they will receive a return phone call from the office ONLY if there are any questions before processing their request. Advised to call back if they haven't received a call from the referral department in 5 days.

## 2021-10-01 DIAGNOSIS — F41.9 ANXIETY DISORDER, UNSPECIFIED TYPE: ICD-10-CM

## 2021-10-01 RX ORDER — ALPRAZOLAM 0.5 MG/1
.5-1 TABLET ORAL NIGHTLY PRN
Qty: 60 TABLET | Refills: 0 | Status: CANCELLED | OUTPATIENT
Start: 2021-10-01 | End: 2021-10-31

## 2021-10-04 ENCOUNTER — TELEPHONE (OUTPATIENT)
Dept: MEDICAL GROUP | Facility: PHYSICIAN GROUP | Age: 56
End: 2021-10-04

## 2021-10-04 NOTE — TELEPHONE ENCOUNTER
----- Message from Christelle Kennedy sent at 10/2/2021 10:05 AM PDT -----  Regarding: Xanax refill  Yesterday I needed my xanax refilled and your office sent it to [facility] and NOT [facility]. I will not get it from [facility] for at the least 10 days! How is that going to help me now?

## 2021-10-05 ENCOUNTER — OFFICE VISIT (OUTPATIENT)
Dept: MEDICAL GROUP | Facility: PHYSICIAN GROUP | Age: 56
End: 2021-10-05
Payer: OTHER GOVERNMENT

## 2021-10-05 VITALS
BODY MASS INDEX: 27.32 KG/M2 | OXYGEN SATURATION: 99 % | TEMPERATURE: 100.5 F | WEIGHT: 170 LBS | SYSTOLIC BLOOD PRESSURE: 130 MMHG | RESPIRATION RATE: 20 BRPM | HEIGHT: 66 IN | DIASTOLIC BLOOD PRESSURE: 80 MMHG | HEART RATE: 88 BPM

## 2021-10-05 DIAGNOSIS — F41.9 ANXIETY DISORDER, UNSPECIFIED TYPE: ICD-10-CM

## 2021-10-05 DIAGNOSIS — Z79.899 LONG TERM PRESCRIPTION BENZODIAZEPINE USE: ICD-10-CM

## 2021-10-05 PROCEDURE — 99214 OFFICE O/P EST MOD 30 MIN: CPT | Performed by: NURSE PRACTITIONER

## 2021-10-05 RX ORDER — ALPRAZOLAM 0.5 MG/1
0.5 TABLET ORAL 2 TIMES DAILY PRN
Qty: 60 TABLET | Refills: 0 | Status: SHIPPED | OUTPATIENT
Start: 2021-12-04 | End: 2021-12-30 | Stop reason: SDUPTHER

## 2021-10-05 RX ORDER — ALPRAZOLAM 0.5 MG/1
0.5 TABLET ORAL 2 TIMES DAILY PRN
Qty: 60 TABLET | Refills: 0 | Status: SHIPPED | OUTPATIENT
Start: 2021-10-05 | End: 2021-11-04

## 2021-10-05 RX ORDER — IBUPROFEN 800 MG/1
TABLET ORAL
COMMUNITY
Start: 2021-09-28 | End: 2023-11-27

## 2021-10-05 RX ORDER — ALPRAZOLAM 0.5 MG/1
0.5 TABLET ORAL 2 TIMES DAILY PRN
Qty: 60 TABLET | Refills: 0 | Status: SHIPPED | OUTPATIENT
Start: 2021-11-04 | End: 2021-12-04

## 2021-10-05 NOTE — ASSESSMENT & PLAN NOTE
Chronic and ongoing. She is taking Alprazolam 0.5 mg twice a day as needed. She states that it helps with her anxiety but it does take awhile for her to get the relief. She has tried a daily medication for her anxiety but she did not like the side effects. She is here for a refill of her Alprazolam.

## 2021-10-05 NOTE — PROGRESS NOTES
Subjective  Chief Complaint  Medication Refill    History of Present Illness  Christelle Kennedy is a 56 y.o. female. This established patient is here today to refill her anxiety medication.    Anxiety disorder, unspecified  Chronic and ongoing. She is taking Alprazolam 0.5 mg twice a day as needed. She states that it helps with her anxiety but it does take awhile for her to get the relief. She has tried a daily medication for her anxiety but she did not like the side effects. She is here for a refill of her Alprazolam.    Long term prescription benzodiazepine use  Chronic and ongoing. She has been on Alprazolam for years for her anxiety.    Past Medical History    Allergies: Latex and Tetracycline  Past Medical History:   Diagnosis Date   • Allergy    • Anxiety    • Arthritis    • Cancer (HCC)     Cervical   • Depression    • Headache(784.0)    • Hyperlipidemia    • Hypertension    • Migraine    • Migraine without aura, without mention of intractable migraine without mention of status migrainosus    • Psoriasis    • Urinary tract infection, site not specified      Past Surgical History:   Procedure Laterality Date   • CHOLECYSTECTOMY  2001   • RHINOPLASTY  1999   • ABDOMINAL SUBTOTAL HYSTERECTOMY  1996    dysplasia   • TONSILLECTOMY       Current Outpatient Medications Ordered in Epic   Medication Sig Dispense Refill   • ibuprofen (MOTRIN) 800 MG Tab      • ALPRAZolam (XANAX) 0.5 MG Tab Take 1 Tablet by mouth 2 times a day as needed for Anxiety for up to 30 days. 60 Tablet 0   • [START ON 11/4/2021] ALPRAZolam (XANAX) 0.5 MG Tab Take 1 Tablet by mouth 2 times a day as needed for Anxiety for up to 30 days. 60 Tablet 0   • [START ON 12/4/2021] ALPRAZolam (XANAX) 0.5 MG Tab Take 1 Tablet by mouth 2 times a day as needed for Anxiety for up to 30 days. 60 Tablet 0   • ALPRAZolam (XANAX) 0.5 MG Tab Take 1 Tablet by mouth at bedtime as needed for Anxiety for up to 5 days. 5 Tablet 0   • rosuvastatin (CRESTOR) 10 MG Tab  TAKE 1 TABLET EVERY EVENING 90 tablet 3   • estradiol (ESTRACE) 2 MG Tab TAKE 1 TABLET BY MOUTH DAILY     • acyclovir (ZOVIRAX) 800 MG Tab      • cyclobenzaprine (FLEXERIL) 10 mg Tab TAKE 1 TABLET BY MOUTH EVERY DAY AS NEEDED FOR FLARES     • Buprenorphine HCl (BELBUCA) 600 MCG FILM Place  into mouth between cheek and gum.     • oxycodone immediate release (ROXICODONE) 10 MG immediate release tablet Take 1 Tab by mouth every 6 hours as needed for Moderate Pain. 120 Tab 0     No current Epic-ordered facility-administered medications on file.     Family History:    Family History   Problem Relation Age of Onset   • Diabetes Mother    • Hypertension Mother    • Cancer Mother 62        breast   • Hyperlipidemia Mother    • Heart Disease Father    • Stroke Paternal Grandmother 82   • Hypertension Sister    • Thyroid Sister    • Lung Disease Neg Hx    • Alcohol/Drug Neg Hx       Personal/Social History:    Social History     Tobacco Use   • Smoking status: Current Every Day Smoker     Packs/day: 0.50     Years: 40.00     Pack years: 20.00     Types: Cigarettes   • Smokeless tobacco: Never Used   • Tobacco comment: encouraged to quit, tried quitting   Vaping Use   • Vaping Use: Never used   Substance Use Topics   • Alcohol use: No     Alcohol/week: 0.0 oz     Comment: occasion 4 x a yr   • Drug use: No     Comment: tired mj in past.     Social History     Social History Narrative   • Not on file      Review of Systems:   General: Negative for fever/chills and unexpected weight change.   Eyes:  Negative for vision changes, eye pain.  ENT:  Negative for hearing changes, ear pain, congestion, sore throat, and neck pain.   Respiratory:  Negative for cough and dyspnea.    Cardiovascular:  Negative for chest pain and palpitations.  Gastrointestinal:  Negative for nausea/vomiting, changes in bowel habits, and abdominal pain.   Genitourinary:  Negative for dysuria and hematuria.   Musculoskeletal:  Negative for myalgias.   Skin:   "Negative for rash.   Neurological:  Negative for numbness/tingling and headaches.   Heme/Lymph:  Does not bruise/bleed easily.     Objective  Physical Exam:   /80 (BP Location: Left arm, Patient Position: Sitting, BP Cuff Size: Adult)   Pulse 88   Temp (!) 38.1 °C (100.5 °F) (Temporal)   Resp 20   Ht 1.676 m (5' 6\")   Wt 77.1 kg (170 lb)   SpO2 99%  Body mass index is 27.44 kg/m².  General:  Alert and oriented.  Well appearing.  NAD  Neck: Supple without JVD. No lymphadenopathy.  Pulmonary:  Normal effort.  Clear to ausculation without rales, ronchi, or wheezing.  Cardiovascular:  Regular rate and rhythm without murmur, rubs or gallop.   Skin:  Warm and dry.  No obvious lesions.  Musculoskeletal:  No extremity cyanosis, clubbing, or edema.      Assessment/Plan  1. Anxiety disorder, unspecified type  2. Long term prescription benzodiazepine use  Chronic and ongoing.  Continue to take Alprazolam 0.5 mg twice a day as needed.  - ALPRAZolam (XANAX) 0.5 MG Tab; Take 1 Tablet by mouth 2 times a day as needed for Anxiety for up to 30 days.  Dispense: 60 Tablet; Refill: 0  - ALPRAZolam (XANAX) 0.5 MG Tab; Take 1 Tablet by mouth 2 times a day as needed for Anxiety for up to 30 days.  Dispense: 60 Tablet; Refill: 0  - ALPRAZolam (XANAX) 0.5 MG Tab; Take 1 Tablet by mouth 2 times a day as needed for Anxiety for up to 30 days.  Dispense: 60 Tablet; Refill: 0    Obtained and reviewed patient utilization report from [facility] pharmacy database on 10/5/2021 at 0930  prior to writing prescription for controlled substance II, III or IV per [place] bill . Based on assessment of the report, the prescription is medically necessary.        Health Maintenance: Discussed with the patient.    Return in about 3 months (around 12/30/2021) for Medication Refill.    Please note that this dictation was created using voice recognition software. I have made every reasonable attempt to correct obvious errors, but I expect that " there are errors of grammar and possibly content that I did not discover before finalizing the note.    CHRISTINA Rivero  [facility]

## 2021-12-30 ENCOUNTER — OFFICE VISIT (OUTPATIENT)
Dept: MEDICAL GROUP | Facility: PHYSICIAN GROUP | Age: 56
End: 2021-12-30
Payer: OTHER GOVERNMENT

## 2021-12-30 VITALS
HEIGHT: 66 IN | TEMPERATURE: 98.5 F | OXYGEN SATURATION: 97 % | SYSTOLIC BLOOD PRESSURE: 138 MMHG | WEIGHT: 165 LBS | HEART RATE: 82 BPM | DIASTOLIC BLOOD PRESSURE: 84 MMHG | BODY MASS INDEX: 26.52 KG/M2 | RESPIRATION RATE: 20 BRPM

## 2021-12-30 DIAGNOSIS — Z79.899 LONG TERM PRESCRIPTION BENZODIAZEPINE USE: ICD-10-CM

## 2021-12-30 DIAGNOSIS — Z86.19 HISTORY OF EPSTEIN-BARR VIRUS INFECTION: ICD-10-CM

## 2021-12-30 DIAGNOSIS — F41.9 ANXIETY DISORDER, UNSPECIFIED TYPE: ICD-10-CM

## 2021-12-30 DIAGNOSIS — M25.50 ARTHRALGIA, UNSPECIFIED JOINT: ICD-10-CM

## 2021-12-30 PROCEDURE — 99214 OFFICE O/P EST MOD 30 MIN: CPT | Performed by: NURSE PRACTITIONER

## 2021-12-30 RX ORDER — ACYCLOVIR 800 MG/1
800 TABLET ORAL 3 TIMES DAILY
Qty: 270 TABLET | Refills: 2 | Status: SHIPPED | OUTPATIENT
Start: 2021-12-30

## 2021-12-30 RX ORDER — ALPRAZOLAM 0.5 MG/1
0.5 TABLET ORAL 2 TIMES DAILY PRN
Qty: 60 TABLET | Refills: 0 | Status: SHIPPED | OUTPATIENT
Start: 2022-01-02 | End: 2022-02-01

## 2021-12-30 RX ORDER — ALPRAZOLAM 0.5 MG/1
0.5 TABLET ORAL 2 TIMES DAILY PRN
Qty: 60 TABLET | Refills: 0 | Status: SHIPPED | OUTPATIENT
Start: 2022-02-01 | End: 2022-03-03

## 2021-12-30 RX ORDER — NITROFURANTOIN 25; 75 MG/1; MG/1
CAPSULE ORAL
COMMUNITY
Start: 2021-11-01 | End: 2022-12-08

## 2021-12-30 RX ORDER — ALPRAZOLAM 0.5 MG/1
0.5 TABLET ORAL 2 TIMES DAILY PRN
Qty: 60 TABLET | Refills: 0 | Status: SHIPPED | OUTPATIENT
Start: 2022-03-03 | End: 2022-04-07 | Stop reason: SDUPTHER

## 2021-12-30 NOTE — ASSESSMENT & PLAN NOTE
Chronic and ongoing. Currently taking Alprazolam 0.5 mg twice a day as needed. She states that right now she feels as though her anxiety is not be well managed. She has a lot of stress in her life right now and she knows it is increasing her anxiety. She is working on managing her stress at this time.

## 2021-12-30 NOTE — PROGRESS NOTES
Subjective  Chief Complaint  Medication Refill    History of Present Illness  Christelle Kennedy is a 56 y.o. female. This established patient is here today to refill her medication.    Anxiety disorder, unspecified  Chronic and ongoing. Currently taking Alprazolam 0.5 mg twice a day as needed. She states that right now she feels as though her anxiety is not be well managed. She has a lot of stress in her life right now and she knows it is increasing her anxiety. She is working on managing her stress at this time.    Past Medical History    Allergies: Latex and Tetracycline  Past Medical History:   Diagnosis Date   • Allergy    • Anxiety    • Arthritis    • Cancer (HCC)     Cervical   • Depression    • Headache(784.0)    • Hyperlipidemia    • Hypertension    • Migraine    • Migraine without aura, without mention of intractable migraine without mention of status migrainosus    • Psoriasis    • Urinary tract infection, site not specified      Past Surgical History:   Procedure Laterality Date   • CHOLECYSTECTOMY  2001   • RHINOPLASTY  1999   • ABDOMINAL SUBTOTAL HYSTERECTOMY  1996    dysplasia   • TONSILLECTOMY       Current Outpatient Medications Ordered in Epic   Medication Sig Dispense Refill   • nitrofurantoin (MACROBID) 100 MG Cap      • acyclovir (ZOVIRAX) 800 MG Tab Take 1 Tablet by mouth 3 times a day. 270 Tablet 2   • [START ON 1/2/2022] ALPRAZolam (XANAX) 0.5 MG Tab Take 1 Tablet by mouth 2 times a day as needed for Anxiety for up to 30 days. 60 Tablet 0   • [START ON 2/1/2022] ALPRAZolam (XANAX) 0.5 MG Tab Take 1 Tablet by mouth 2 times a day as needed for up to 30 days. 60 Tablet 0   • [START ON 3/3/2022] ALPRAZolam (XANAX) 0.5 MG Tab Take 1 Tablet by mouth 2 times a day as needed for Anxiety for up to 30 days. 60 Tablet 0   • ibuprofen (MOTRIN) 800 MG Tab      • rosuvastatin (CRESTOR) 10 MG Tab TAKE 1 TABLET EVERY EVENING 90 tablet 3   • estradiol (ESTRACE) 2 MG Tab TAKE 1 TABLET BY MOUTH DAILY     •  "cyclobenzaprine (FLEXERIL) 10 mg Tab TAKE 1 TABLET BY MOUTH EVERY DAY AS NEEDED FOR FLARES     • Buprenorphine HCl (BELBUCA) 600 MCG FILM Place  into mouth between cheek and gum.     • oxycodone immediate release (ROXICODONE) 10 MG immediate release tablet Take 1 Tab by mouth every 6 hours as needed for Moderate Pain. 120 Tab 0     No current Epic-ordered facility-administered medications on file.     Family History:    Family History   Problem Relation Age of Onset   • Diabetes Mother    • Hypertension Mother    • Cancer Mother 62        breast   • Hyperlipidemia Mother    • Heart Disease Father    • Stroke Paternal Grandmother 82   • Hypertension Sister    • Thyroid Sister    • Lung Disease Neg Hx    • Alcohol/Drug Neg Hx       Personal/Social History:    Social History     Tobacco Use   • Smoking status: Current Every Day Smoker     Packs/day: 0.50     Years: 40.00     Pack years: 20.00     Types: Cigarettes   • Smokeless tobacco: Never Used   • Tobacco comment: encouraged to quit, tried quitting   Vaping Use   • Vaping Use: Never used   Substance Use Topics   • Alcohol use: No     Alcohol/week: 0.0 oz     Comment: occasion 4 x a yr   • Drug use: No     Comment: tired mj in past.     Social History     Social History Narrative   • Not on file      Review of Systems:   General: Negative for fever/chills and unexpected weight change.   Eyes:  Negative for vision changes, eye pain.  Respiratory:  Negative for cough and dyspnea.    Cardiovascular:  Negative for chest pain and palpitations.  Musculoskeletal: Positive for joint pain.  Skin:  Negative for rash.     Objective  Physical Exam:   /84 (BP Location: Right arm, Patient Position: Sitting, BP Cuff Size: Adult)   Pulse 82   Temp 36.9 °C (98.5 °F) (Temporal)   Resp 20   Ht 1.676 m (5' 6\")   Wt 74.8 kg (165 lb)   SpO2 97%  Body mass index is 26.63 kg/m².  General:  Alert and oriented.  Well appearing.  NAD  Neck: Supple without JVD. No " lymphadenopathy.  Pulmonary:  Normal effort.  Clear to ausculation without rales, ronchi, or wheezing.  Cardiovascular:  Regular rate and rhythm without murmur, rubs or gallop.   Skin:  Warm and dry.  No obvious lesions.  Musculoskeletal:  No extremity cyanosis, clubbing, or edema.      Assessment/Plan  1. Anxiety disorder, unspecified type  2. Long term prescription benzodiazepine use  Chronic and ongoing.  Continue to take Alprazolam 0.5 mg one tablet twice a day as needed for anxiety.  Discussed with her that she should think about starting a daily medication for her anxiety, discussed in length Cymbalta, she will talk to her pain management doctor about possibly starting the medication.  - ALPRAZolam (XANAX) 0.5 MG Tab; Take 1 Tablet by mouth 2 times a day as needed for Anxiety for up to 30 days.  Dispense: 60 Tablet; Refill: 0  - ALPRAZolam (XANAX) 0.5 MG Tab; Take 1 Tablet by mouth 2 times a day as needed for up to 30 days.  Dispense: 60 Tablet; Refill: 0  - ALPRAZolam (XANAX) 0.5 MG Tab; Take 1 Tablet by mouth 2 times a day as needed for Anxiety for up to 30 days.  Dispense: 60 Tablet; Refill: 0    Obtained and reviewed patient utilization report from [facility] pharmacy database on 12/30/21 at 1417 prior to writing prescription for controlled substance II, III or IV per [place] bill . Based on assessment of the report, the prescription is medically necessary.    3. 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  Requesting a refill of Acyclovir as she is having a flare up of joint pain from her history of Neeraj-Barr.  - acyclovir (ZOVIRAX) 800 MG Tab; Take 1 Tablet by mouth 3 times a day.  Dispense: 270 Tablet; Refill: 2        Health Maintenance: Completed    Return in about 3 months (around 3/30/2022) for Medication Refill.    Please note that this dictation was created using voice recognition software. I have made every reasonable attempt to correct  obvious errors, but I expect that there are errors of grammar and possibly content that I did not discover before finalizing the note.    CHRISTINA Rivero  [facility]

## 2022-01-04 DIAGNOSIS — G89.4 CHRONIC PAIN SYNDROME: ICD-10-CM

## 2022-01-04 RX ORDER — DULOXETIN HYDROCHLORIDE 30 MG/1
30 CAPSULE, DELAYED RELEASE ORAL DAILY
Qty: 90 CAPSULE | Refills: 1 | Status: SHIPPED | OUTPATIENT
Start: 2022-01-04 | End: 2022-04-07 | Stop reason: DRUGHIGH

## 2022-01-04 NOTE — PROGRESS NOTES
1. Chronic pain syndrome  - DULoxetine (CYMBALTA) 30 MG Cap DR Particles; Take 1 Capsule by mouth every day.  Dispense: 90 Capsule; Refill: 1

## 2022-02-09 DIAGNOSIS — R19.7 DIARRHEA, UNSPECIFIED TYPE: ICD-10-CM

## 2022-03-30 ENCOUNTER — HOSPITAL ENCOUNTER (OUTPATIENT)
Facility: MEDICAL CENTER | Age: 57
End: 2022-03-30
Attending: NURSE PRACTITIONER
Payer: OTHER GOVERNMENT

## 2022-03-30 PROCEDURE — 87493 C DIFF AMPLIFIED PROBE: CPT

## 2022-03-31 DIAGNOSIS — R19.7 DIARRHEA, UNSPECIFIED TYPE: ICD-10-CM

## 2022-03-31 LAB
C DIFF DNA SPEC QL NAA+PROBE: NEGATIVE
C DIFF TOX GENS STL QL NAA+PROBE: NEGATIVE

## 2022-04-07 ENCOUNTER — TELEMEDICINE (OUTPATIENT)
Dept: MEDICAL GROUP | Facility: PHYSICIAN GROUP | Age: 57
End: 2022-04-07
Payer: OTHER GOVERNMENT

## 2022-04-07 VITALS — RESPIRATION RATE: 18 BRPM | BODY MASS INDEX: 25.23 KG/M2 | HEIGHT: 66 IN | WEIGHT: 157 LBS

## 2022-04-07 DIAGNOSIS — N95.1 MENOPAUSAL SYMPTOMS: ICD-10-CM

## 2022-04-07 DIAGNOSIS — F41.9 ANXIETY DISORDER, UNSPECIFIED TYPE: ICD-10-CM

## 2022-04-07 PROCEDURE — 99214 OFFICE O/P EST MOD 30 MIN: CPT | Performed by: PHYSICIAN ASSISTANT

## 2022-04-07 RX ORDER — ESTRADIOL 2 MG/1
2 TABLET ORAL DAILY
Qty: 30 TABLET | Refills: 0 | Status: SHIPPED | OUTPATIENT
Start: 2022-04-07 | End: 2022-05-03 | Stop reason: SDUPTHER

## 2022-04-07 RX ORDER — DULOXETIN HYDROCHLORIDE 60 MG/1
60 CAPSULE, DELAYED RELEASE ORAL DAILY
Qty: 30 CAPSULE | Refills: 0 | Status: SHIPPED
Start: 2022-04-07 | End: 2022-05-03

## 2022-04-07 RX ORDER — ALPRAZOLAM 0.5 MG/1
0.5 TABLET ORAL 2 TIMES DAILY PRN
Qty: 60 TABLET | Refills: 0 | Status: SHIPPED | OUTPATIENT
Start: 2022-04-07 | End: 2022-05-07

## 2022-04-07 ASSESSMENT — ENCOUNTER SYMPTOMS
FEVER: 0
CHILLS: 0
NERVOUS/ANXIOUS: 1
SHORTNESS OF BREATH: 0

## 2022-04-07 ASSESSMENT — PATIENT HEALTH QUESTIONNAIRE - PHQ9: CLINICAL INTERPRETATION OF PHQ2 SCORE: 0

## 2022-04-07 NOTE — PROGRESS NOTES
Virtual Visit: Established Patient   This visit was conducted via Zoom using secure and encrypted videoconferencing technology.   The patient was in their home in the [facility].    The patient's identity was confirmed and verbal consent was obtained for this virtual visit.     Subjective:   CC:   Chief Complaint   Patient presents with   • Medication Refill     Xanax, estradiol       Christelle Kennedy is a 57 y.o. female presenting for evaluation and management of:    Anxiety disorder, unspecified  Patient reports she is taking Xanax 1-2 times per day as needed. Was started on Cymbalta and anxiety has been much better lately. Is interested     Menopausal symptoms  Patient is currently on estradiol 2 mg daily.  This was prescribed by her gynecologist, however she is to run out of the medication.  Patient is wondering if we can take over prescribing so she does not have to go to multiple providers offices.      ROS   Review of Systems   Constitutional: Negative for chills and fever.   Respiratory: Negative for shortness of breath.    Cardiovascular: Negative for chest pain.   Psychiatric/Behavioral: The patient is nervous/anxious.        Current medicines (including changes today)  Current Outpatient Medications   Medication Sig Dispense Refill   • estradiol (ESTRACE) 2 MG Tab Take 1 Tablet by mouth every day. 30 Tablet 0   • ALPRAZolam (XANAX) 0.5 MG Tab Take 1 Tablet by mouth 2 times a day as needed for Anxiety for up to 30 days. 60 Tablet 0   • DULoxetine (CYMBALTA) 60 MG Cap DR Particles delayed-release capsule Take 1 Capsule by mouth every day. 30 Capsule 0   • nitrofurantoin (MACROBID) 100 MG Cap      • acyclovir (ZOVIRAX) 800 MG Tab Take 1 Tablet by mouth 3 times a day. 270 Tablet 2   • ibuprofen (MOTRIN) 800 MG Tab      • rosuvastatin (CRESTOR) 10 MG Tab TAKE 1 TABLET EVERY EVENING 90 tablet 3   • cyclobenzaprine (FLEXERIL) 10 mg Tab TAKE 1 TABLET BY MOUTH EVERY DAY AS NEEDED FOR FLARES     •  "Buprenorphine HCl (BELBUCA) 600 MCG FILM Place  into mouth between cheek and gum.     • oxycodone immediate release (ROXICODONE) 10 MG immediate release tablet Take 1 Tab by mouth every 6 hours as needed for Moderate Pain. 120 Tab 0     No current facility-administered medications for this visit.       Patient Active Problem List    Diagnosis Date Noted   • Menopausal symptoms 04/07/2022   • Skin cancer screening 06/22/2021   • Chronic pain syndrome 08/05/2016   • Sleep disorder 08/05/2016   • Anxiety disorder, unspecified 08/05/2016   • Opiate dependence, continuous (HCC) 02/16/2016   • Long term prescription opiate use 02/16/2016   • Long term prescription benzodiazepine use 02/16/2016   • Migraine without aura    • Psoriasis 06/12/2014   • Vitamin d deficiency 12/18/2012   • Neuropathic pain of hand 07/23/2012   • Neuropathy 07/17/2012   • High cholesterol 06/25/2011   • Joint pain 06/25/2011        Objective:   Resp 18   Ht 1.676 m (5' 6\") Comment: per pt  Wt 71.2 kg (157 lb) Comment: per pt  BMI 25.34 kg/m²     Physical Exam:  Constitutional: Alert, no distress, well-groomed.  Skin: No rashes in visible areas.  Eye: Round. Conjunctiva clear, lids normal. No icterus.   ENMT: Lips pink without lesions, good dentition, moist mucous membranes. Phonation normal.  Neck: No masses, no thyromegaly. Moves freely without pain.  Respiratory: Unlabored respiratory effort, no cough or audible wheeze  Psych: Alert and oriented x3, normal affect and mood.     Assessment and Plan:   The following treatment plan was discussed:     1. Anxiety disorder, unspecified type  Chronic.  Patient was started on duloxetine 30 mg 3 months ago and reports that it is doing well for her, but she would like to increase the dose of it.  Plan to increase the dose of duloxetine to 60 mg.  We will have the patient follow-up with her PCP in 1 month to discuss.  She has noticed that she is needing Xanax less frequently since starting duloxetine.  " Ideally patient will decrease the dose or taper off of Xanax as her anxiety is better controlled with the duloxetine.    I have obtained and reviewed patient utilization report from [facility] [place] [facility] pharmacy database prior to writing prescription for controlled substance II, III or IV. Based on the report and my clinical assessment the prescription is medically necessary. Patient is cautioned on sedation potential of narcotic medication; no drinking, driving or operating heavy machinery while on this medication. I have also made a good bess effort to obtain applicable records from other providers who have treated the patient and records did not demonstrate any increased risk of substance abuse that would prevent me from prescribing controlled substances.     - ALPRAZolam (XANAX) 0.5 MG Tab; Take 1 Tablet by mouth 2 times a day as needed for Anxiety for up to 30 days.  Dispense: 60 Tablet; Refill: 0  - DULoxetine (CYMBALTA) 60 MG Cap DR Particles delayed-release capsule; Take 1 Capsule by mouth every day.  Dispense: 30 Capsule; Refill: 0    2. Menopausal symptoms  Patient reports that she was having significant hot flashes and her gynecologist put her on estradiol 2 mg daily.  She has been on this for just over a year now and is about to run out of her medication.  She would like to know if our office can take over prescribing, however discussed that I can put in a 1 month refill until she can be seen by her PCP to discuss further.  Patient is educated about increased risk for cancer, blood clots, stroke, heart attack. She did discuss possibility of bioidentical HRT or doing lab work, but will defer this decision to PCP.     - estradiol (ESTRACE) 2 MG Tab; Take 1 Tablet by mouth every day.  Dispense: 30 Tablet; Refill: 0      Follow-up: Return in about 4 weeks (around 5/5/2022) for follow up on meds.

## 2022-04-07 NOTE — ASSESSMENT & PLAN NOTE
Patient reports she is taking Xanax 1-2 times per day as needed. Was started on Cymbalta and anxiety has been much better lately. Is interested

## 2022-04-07 NOTE — ASSESSMENT & PLAN NOTE
Patient is currently on estradiol 2 mg daily.  This was prescribed by her gynecologist, however she is to run out of the medication.  Patient is wondering if we can take over prescribing so she does not have to go to multiple providers offices.

## 2022-05-03 ENCOUNTER — TELEMEDICINE (OUTPATIENT)
Dept: MEDICAL GROUP | Facility: PHYSICIAN GROUP | Age: 57
End: 2022-05-03
Payer: OTHER GOVERNMENT

## 2022-05-03 VITALS — RESPIRATION RATE: 20 BRPM | HEIGHT: 66 IN | BODY MASS INDEX: 24.91 KG/M2 | WEIGHT: 155 LBS

## 2022-05-03 DIAGNOSIS — E78.49 OTHER HYPERLIPIDEMIA: ICD-10-CM

## 2022-05-03 DIAGNOSIS — N95.1 MENOPAUSAL SYMPTOMS: ICD-10-CM

## 2022-05-03 DIAGNOSIS — F41.9 ANXIETY DISORDER, UNSPECIFIED TYPE: ICD-10-CM

## 2022-05-03 PROCEDURE — 99214 OFFICE O/P EST MOD 30 MIN: CPT | Performed by: NURSE PRACTITIONER

## 2022-05-03 RX ORDER — ROSUVASTATIN CALCIUM 10 MG/1
TABLET, COATED ORAL
Qty: 90 TABLET | Refills: 3 | Status: SHIPPED | OUTPATIENT
Start: 2022-05-03 | End: 2023-05-23 | Stop reason: SDUPTHER

## 2022-05-03 RX ORDER — ALPRAZOLAM 0.5 MG/1
0.5 TABLET ORAL 2 TIMES DAILY PRN
Qty: 45 TABLET | Refills: 0 | Status: SHIPPED | OUTPATIENT
Start: 2022-06-06 | End: 2022-07-06

## 2022-05-03 RX ORDER — ESTRADIOL 2 MG/1
2 TABLET ORAL DAILY
Qty: 90 TABLET | Refills: 1 | Status: SHIPPED | OUTPATIENT
Start: 2022-05-03 | End: 2022-10-12

## 2022-05-03 RX ORDER — DULOXETIN HYDROCHLORIDE 30 MG/1
30 CAPSULE, DELAYED RELEASE ORAL DAILY
Qty: 90 CAPSULE | Refills: 1 | Status: SHIPPED | OUTPATIENT
Start: 2022-05-03 | End: 2022-08-04

## 2022-05-03 RX ORDER — ALPRAZOLAM 0.5 MG/1
0.5 TABLET ORAL 2 TIMES DAILY PRN
Qty: 45 TABLET | Refills: 0 | Status: SHIPPED | OUTPATIENT
Start: 2022-05-07 | End: 2022-06-06

## 2022-05-03 RX ORDER — ALPRAZOLAM 0.5 MG/1
0.5 TABLET ORAL 2 TIMES DAILY PRN
Qty: 45 TABLET | Refills: 0 | Status: SHIPPED
Start: 2022-07-06 | End: 2022-08-04

## 2022-05-03 NOTE — ASSESSMENT & PLAN NOTE
Chronic and ongoing. Currently taking Estradiol 2 mg daily. She is requesting a refill at this time.

## 2022-05-03 NOTE — PROGRESS NOTES
Virtual Visit: Established Patient   This visit was conducted via Zoom using secure and encrypted videoconferencing technology. The patient was in a private location in the state of [place].    The patient's identity was confirmed and verbal consent was obtained for this virtual visit.    Subjective  Chief Complaint  Medication Refill and Change in Prescription    History of Present Illness  Christelle Kennedy is a 57 y.o. female. This established patient is here today to refill her anxiety medications.    Anxiety disorder, unspecified  Chronic and ongoing. Currently taking Cymbalta 60 mg daily. She states that she was previously on 30 mg and she was doing better on that dosage. She does use Xanax 0.5 mg as needed twice a day. She states that she does take one at night to sleep because her anxiety increases at night time. She states that she does not have to take it as often through out the day since being on Cymbalta.    Menopausal symptoms  Chronic and ongoing. Currently taking Estradiol 2 mg daily. She is requesting a refill at this time.    Past Medical History    Allergies: Latex and Tetracycline  Past Medical History:   Diagnosis Date   • Allergy    • Anxiety    • Arthritis    • Cancer (HCC)     Cervical   • Depression    • Headache(784.0)    • Hyperlipidemia    • Hypertension    • Migraine    • Migraine without aura, without mention of intractable migraine without mention of status migrainosus    • Psoriasis    • Urinary tract infection, site not specified      Past Surgical History:   Procedure Laterality Date   • CHOLECYSTECTOMY  2001   • RHINOPLASTY  1999   • ABDOMINAL SUBTOTAL HYSTERECTOMY  1996    dysplasia   • TONSILLECTOMY       Current Outpatient Medications Ordered in Epic   Medication Sig Dispense Refill   • DULoxetine (CYMBALTA) 30 MG Cap DR Particles Take 1 Capsule by mouth every day. 90 Capsule 1   • rosuvastatin (CRESTOR) 10 MG Tab TAKE 1 TABLET EVERY EVENING 90 Tablet 3   • [START ON 5/7/2022]  ALPRAZolam (XANAX) 0.5 MG Tab Take 1 Tablet by mouth 2 times a day as needed for Anxiety for up to 30 days. 45 Tablet 0   • [START ON 6/6/2022] ALPRAZolam (XANAX) 0.5 MG Tab Take 1 Tablet by mouth 2 times a day as needed for Anxiety for up to 30 days. 45 Tablet 0   • [START ON 7/6/2022] ALPRAZolam (XANAX) 0.5 MG Tab Take 1 Tablet by mouth 2 times a day as needed for Anxiety for up to 30 days. 45 Tablet 0   • estradiol (ESTRACE) 2 MG Tab Take 1 Tablet by mouth every day. 90 Tablet 1   • ALPRAZolam (XANAX) 0.5 MG Tab Take 1 Tablet by mouth 2 times a day as needed for Anxiety for up to 30 days. 60 Tablet 0   • nitrofurantoin (MACROBID) 100 MG Cap      • acyclovir (ZOVIRAX) 800 MG Tab Take 1 Tablet by mouth 3 times a day. 270 Tablet 2   • ibuprofen (MOTRIN) 800 MG Tab      • cyclobenzaprine (FLEXERIL) 10 mg Tab TAKE 1 TABLET BY MOUTH EVERY DAY AS NEEDED FOR FLARES     • Buprenorphine HCl (BELBUCA) 600 MCG FILM Place  into mouth between cheek and gum.     • oxycodone immediate release (ROXICODONE) 10 MG immediate release tablet Take 1 Tab by mouth every 6 hours as needed for Moderate Pain. 120 Tab 0     No current Epic-ordered facility-administered medications on file.     Family History:    Family History   Problem Relation Age of Onset   • Diabetes Mother    • Hypertension Mother    • Cancer Mother 62        breast   • Hyperlipidemia Mother    • Heart Disease Father    • Stroke Paternal Grandmother 82   • Hypertension Sister    • Thyroid Sister    • Lung Disease Neg Hx    • Alcohol/Drug Neg Hx       Personal/Social History:    Social History     Tobacco Use   • Smoking status: Current Every Day Smoker     Packs/day: 0.50     Years: 40.00     Pack years: 20.00     Types: Cigarettes   • Smokeless tobacco: Never Used   • Tobacco comment: encouraged to quit, tried quitting   Vaping Use   • Vaping Use: Never used   Substance Use Topics   • Alcohol use: No     Alcohol/week: 0.0 oz     Comment: occasion 4 x a yr   • Drug use:  "No     Comment: tired mj in past.     Social History     Social History Narrative   • Not on file        Review of Systems:   General: Negative for fever/chills and expected weight change.   Respiratory:  Negative for cough and dyspnea.    Cardiovascular:  Negative for chest pain and palpitations.  Gastrointestinal:  Negative for nausea/vomiting, changes in bowel habits, and abdominal pain.   Skin:  Negative for rash.   Neurological:  Negative for numbness/tingling and headaches.     Objective  Physical Exam:   Resp 20   Ht 1.676 m (5' 6\")   Wt 70.3 kg (155 lb)  Body mass index is 25.02 kg/m².  Constitutional: Alert, no distress, well-groomed.  Skin: No rashes in visible areas.  Eye: Round. Conjunctiva clear, lids normal. No icterus.   ENMT: Lips pink without lesions, good dentition, moist mucous membranes. Phonation normal.  Neck: No masses, no thyromegaly on visible inspection. Moves freely without pain.  Respiratory: Unlabored respiratory effort, no cough or audible wheeze  Psych: Alert and oriented x3, normal affect and mood.       Assessment/Plan  1. Anxiety disorder, unspecified type  Chronic and ongoing.  She would like to decrease her Cymbalta back to 30 mg, ordered a refill of this medication for her.  Continue to take Xanax 0.5 mg one tablet twice a day as needed.  - DULoxetine (CYMBALTA) 30 MG Cap DR Particles; Take 1 Capsule by mouth every day.  Dispense: 90 Capsule; Refill: 1  - ALPRAZolam (XANAX) 0.5 MG Tab; Take 1 Tablet by mouth 2 times a day as needed for Anxiety for up to 30 days.  Dispense: 45 Tablet; Refill: 0  - ALPRAZolam (XANAX) 0.5 MG Tab; Take 1 Tablet by mouth 2 times a day as needed for Anxiety for up to 30 days.  Dispense: 45 Tablet; Refill: 0  - ALPRAZolam (XANAX) 0.5 MG Tab; Take 1 Tablet by mouth 2 times a day as needed for Anxiety for up to 30 days.  Dispense: 45 Tablet; Refill: 0    Obtained and reviewed patient utilization report from [facility] pharmacy database on 5/3/22 at " 1107 prior to writing prescription for controlled substance II, III or IV per [place] bill . Based on assessment of the report, the prescription is medically necessary.    2. Menopausal symptoms  Chronic and ongoing.  Continue to take Estradiol 2 mg daily.  - estradiol (ESTRACE) 2 MG Tab; Take 1 Tablet by mouth every day.  Dispense: 90 Tablet; Refill: 1    3. Other hyperlipidemia  Chronic and ongoing.  Continue to take Rosuvastatin 10 mg every evening.  - rosuvastatin (CRESTOR) 10 MG Tab; TAKE 1 TABLET EVERY EVENING  Dispense: 90 Tablet; Refill: 3      Health Maintenance: Completed    Return in about 3 months (around 8/3/2022) for Medication Refill.    Please note that this dictation was created using voice recognition software. I have made every reasonable attempt to correct obvious errors, but I expect that there are errors of grammar and possibly content that I did not discover before finalizing the note.    CHRISTINA Rivero  [facility]

## 2022-05-03 NOTE — ASSESSMENT & PLAN NOTE
Chronic and ongoing. Currently taking Cymbalta 60 mg daily. She states that she was previously on 30 mg and she was doing better on that dosage. She does use Xanax 0.5 mg as needed twice a day. She states that she does take one at night to sleep because her anxiety increases at night time. She states that she does not have to take it as often through out the day since being on Cymbalta.

## 2022-08-04 ENCOUNTER — OFFICE VISIT (OUTPATIENT)
Dept: MEDICAL GROUP | Facility: PHYSICIAN GROUP | Age: 57
End: 2022-08-04
Payer: OTHER GOVERNMENT

## 2022-08-04 VITALS
TEMPERATURE: 97.9 F | HEIGHT: 66 IN | BODY MASS INDEX: 27.02 KG/M2 | RESPIRATION RATE: 18 BRPM | OXYGEN SATURATION: 97 % | WEIGHT: 168.13 LBS | HEART RATE: 66 BPM | SYSTOLIC BLOOD PRESSURE: 120 MMHG | DIASTOLIC BLOOD PRESSURE: 74 MMHG

## 2022-08-04 DIAGNOSIS — E78.00 HIGH CHOLESTEROL: ICD-10-CM

## 2022-08-04 DIAGNOSIS — E55.9 VITAMIN D DEFICIENCY: ICD-10-CM

## 2022-08-04 DIAGNOSIS — F41.9 ANXIETY DISORDER, UNSPECIFIED TYPE: ICD-10-CM

## 2022-08-04 DIAGNOSIS — N95.1 MENOPAUSAL SYMPTOMS: ICD-10-CM

## 2022-08-04 DIAGNOSIS — K14.6 BURNING MOUTH SYNDROME: ICD-10-CM

## 2022-08-04 DIAGNOSIS — Z13.1 ENCOUNTER FOR SCREENING FOR DIABETES MELLITUS: ICD-10-CM

## 2022-08-04 PROCEDURE — 99214 OFFICE O/P EST MOD 30 MIN: CPT | Performed by: NURSE PRACTITIONER

## 2022-08-04 RX ORDER — CLONAZEPAM 0.5 MG/1
0.5 TABLET ORAL 2 TIMES DAILY
Qty: 60 TABLET | Refills: 0 | Status: SHIPPED | OUTPATIENT
Start: 2022-08-04 | End: 2022-09-03

## 2022-08-04 NOTE — ASSESSMENT & PLAN NOTE
"Chronic, ongoing.  Currently taking Rosuvastatin 10 mg as directed.  Denies side effects of the medication--no myalgias or abdominal pain.  10-year cardiac risk ASCVD score: 6.7%  Reports diet is \"okay\".   She is following a low-cholesterol diet.  She is exercising regularly.  She is not taking ASA daily.  She denies dizziness, claudication, or chest pain.  Due for updated lab work.     06/30/17 06:25 10/17/18 10:11 07/29/21 17:25   Cholesterol,Tot 164 147 233 (H)   Triglycerides 121 79 136   HDL 61 68 68   LDL 79 63 138 (H)     "

## 2022-08-04 NOTE — ASSESSMENT & PLAN NOTE
Chronic, stable.  Denies fatigue.  Denies any muscle weakness.  No history of CKD.  Reports having a good diet, including dairy products.  No history of IBD's, celiac, CF, or surgeries causing malabsorption.  Patient is not obese.  Is taking vitamin d and calcium supplement.   Due for updated lab work.

## 2022-08-04 NOTE — ASSESSMENT & PLAN NOTE
Chronic and ongoing. Currently taking Xanax 0.5 mg as needed twice a day. She states that the medication is working well. She is no longer taking Cymbalta. She states that she mostly takes the Xanax at night. Denies any side effects from the medication.

## 2022-08-04 NOTE — ASSESSMENT & PLAN NOTE
Chronic and worsening. She has been dealing with a burning mouth sensation for the past two years. She has seen many specialist for it and has tried many different options to try to relieve the burning sensation. She states that it continues to be a problem and she only gets relief when she is sleeping at night. She states that she did some research and saw that Klonopin may help with the burning mouth. She is interested in trying the medication.

## 2022-08-04 NOTE — ASSESSMENT & PLAN NOTE
Chronic and ongoing. Currently taking Estradiol 2 mg daily. She is requesting to get updated lab work. She states that she tried to stop the medication but she got her hot flashes back right away.

## 2022-08-04 NOTE — PROGRESS NOTES
"Subjective  Chief Complaint  Burning Mouth    History of Present Illness  Christelle Kennedy is a 57 y.o. female. This established patient is here today to discuss her burning mouth and anxiety.    Anxiety disorder, unspecified  Chronic and ongoing. Currently taking Xanax 0.5 mg as needed twice a day. She states that the medication is working well. She is no longer taking Cymbalta. She states that she mostly takes the Xanax at night. Denies any side effects from the medication.    Burning mouth syndrome  Chronic and worsening. She has been dealing with a burning mouth sensation for the past two years. She has seen many specialist for it and has tried many different options to try to relieve the burning sensation. She states that it continues to be a problem and she only gets relief when she is sleeping at night. She states that she did some research and saw that Klonopin may help with the burning mouth. She is interested in trying the medication.    Vitamin D deficiency  Chronic, stable.  Denies fatigue.  Denies any muscle weakness.  No history of CKD.  Reports having a good diet, including dairy products.  No history of IBD's, celiac, CF, or surgeries causing malabsorption.  Patient is not obese.  Is taking vitamin d and calcium supplement.   Due for updated lab work.    High cholesterol  Chronic, ongoing.  Currently taking Rosuvastatin 10 mg as directed.  Denies side effects of the medication--no myalgias or abdominal pain.  10-year cardiac risk ASCVD score: 6.7%  Reports diet is \"okay\".   She is following a low-cholesterol diet.  She is exercising regularly.  She is not taking ASA daily.  She denies dizziness, claudication, or chest pain.  Due for updated lab work.     06/30/17 06:25 10/17/18 10:11 07/29/21 17:25   Cholesterol,Tot 164 147 233 (H)   Triglycerides 121 79 136   HDL 61 68 68   LDL 79 63 138 (H)       Menopausal symptoms  Chronic and ongoing. Currently taking Estradiol 2 mg daily. She is requesting to " get updated lab work. She states that she tried to stop the medication but she got her hot flashes back right away.    Past Medical History    Allergies: Latex and Tetracycline  Past Medical History:   Diagnosis Date   • Allergy    • Anxiety    • Arthritis    • Cancer (HCC)     Cervical   • Depression    • Headache(784.0)    • Hyperlipidemia    • Hypertension    • Migraine    • Migraine without aura, without mention of intractable migraine without mention of status migrainosus    • Psoriasis    • Urinary tract infection, site not specified      Past Surgical History:   Procedure Laterality Date   • CHOLECYSTECTOMY  2001   • RHINOPLASTY  1999   • ABDOMINAL SUBTOTAL HYSTERECTOMY  1996    dysplasia   • TONSILLECTOMY       Current Outpatient Medications Ordered in Epic   Medication Sig Dispense Refill   • clonazePAM (KLONOPIN) 0.5 MG Tab Take 1 Tablet by mouth 2 times a day for 30 days. 60 Tablet 0   • rosuvastatin (CRESTOR) 10 MG Tab TAKE 1 TABLET EVERY EVENING 90 Tablet 3   • estradiol (ESTRACE) 2 MG Tab Take 1 Tablet by mouth every day. 90 Tablet 1   • nitrofurantoin (MACROBID) 100 MG Cap      • acyclovir (ZOVIRAX) 800 MG Tab Take 1 Tablet by mouth 3 times a day. 270 Tablet 2   • ibuprofen (MOTRIN) 800 MG Tab      • cyclobenzaprine (FLEXERIL) 10 mg Tab TAKE 1 TABLET BY MOUTH EVERY DAY AS NEEDED FOR FLARES     • Buprenorphine HCl (BELBUCA) 600 MCG FILM Place  into mouth between cheek and gum.     • oxycodone immediate release (ROXICODONE) 10 MG immediate release tablet Take 1 Tab by mouth every 6 hours as needed for Moderate Pain. 120 Tab 0     No current Epic-ordered facility-administered medications on file.     Family History:    Family History   Problem Relation Age of Onset   • Diabetes Mother    • Hypertension Mother    • Cancer Mother 62        breast   • Hyperlipidemia Mother    • Heart Disease Father    • Stroke Paternal Grandmother 82   • Hypertension Sister    • Thyroid Sister    • Lung Disease Neg Hx    •  "Alcohol/Drug Neg Hx       Personal/Social History:    Social History     Tobacco Use   • Smoking status: Current Every Day Smoker     Packs/day: 0.50     Years: 40.00     Pack years: 20.00     Types: Cigarettes   • Smokeless tobacco: Never Used   • Tobacco comment: encouraged to quit, tried quitting   Vaping Use   • Vaping Use: Never used   Substance Use Topics   • Alcohol use: No     Alcohol/week: 0.0 oz     Comment: occasion 4 x a yr   • Drug use: No     Comment: tired mj in past.     Social History     Social History Narrative   • Not on file      Review of Systems:   General: Negative for fever/chills and unexpected weight change.   Eyes:  Negative for vision changes, eye pain.  ENT:  Negative for hearing changes, ear pain, congestion, sore throat, and neck pain. Positive for burning mouth sensation.  Respiratory:  Negative for cough and dyspnea.    Cardiovascular:  Negative for chest pain and palpitations.  Musculoskeletal:  Negative for myalgias.   Skin:  Negative for rash.   Neurological:  Negative for numbness/tingling and headaches.     Objective  Physical Exam:   /74 (BP Location: Left arm, Patient Position: Sitting, BP Cuff Size: Adult)   Pulse 66   Temp 36.6 °C (97.9 °F) (Temporal)   Resp 18   Ht 1.676 m (5' 6\")   Wt 76.3 kg (168 lb 2 oz)   SpO2 97%  Body mass index is 27.14 kg/m².  General:  Alert and oriented.  Well appearing.  NAD  Neck: Supple without JVD. No lymphadenopathy.  Pulmonary:  Normal effort.  Clear to ausculation without rales, ronchi, or wheezing.  Cardiovascular:  Regular rate and rhythm without murmur, rubs or gallop.   Skin:  Warm and dry.  No obvious lesions.  Musculoskeletal:  No extremity cyanosis, clubbing, or edema.      Assessment/Plan  1. Anxiety disorder, unspecified type  Chronic and ongoing.  Discussed Clonazepam risks, benefits and side effects, she verbalized understanding.  Take Clonazepam 0.5 mg twice a day as needed for anxiety.  - clonazePAM (KLONOPIN) 0.5 " MG Tab; Take 1 Tablet by mouth 2 times a day for 30 days.  Dispense: 60 Tablet; Refill: 0    Obtained and reviewed patient utilization report from [facility] pharmacy database on 8/4/22 at 0929 prior to writing prescription for controlled substance II, III or IV per [place] bill . Based on assessment of the report, the prescription is medically necessary.    2. Burning mouth syndrome  Chronic and ongoing.  Discussed that since she has seen many specialist and has tried several treatment options that we can try Clonazepam to see if it helps with her burning mouth sensation. She will follow up with me in one month to discuss if it has improved her symptoms.  - clonazePAM (KLONOPIN) 0.5 MG Tab; Take 1 Tablet by mouth 2 times a day for 30 days.  Dispense: 60 Tablet; Refill: 0    3. Menopausal symptoms  Chronic and ongoing.  Continue to take Estradiol 2 mg daily.  Due for updated labs.  - PROGESTERONE; Future  - TESTOSTERONE F&T FEMALES/CHILD; Future  - ESTROGEN TOTAL; Future    4. High cholesterol  Chronic and ongoing.  Continue to take Rosuvastatin 10 mg every evening.  Educated on a healthy diet and exercise.  Due for updated labs.  - Lipid Profile; Future    5. Vitamin D deficiency  Chronic and ongoing.  Continue to take Vitamin D and Calcium supplement.  Due for updated labs.  - VITAMIN D,25 HYDROXY; Future    6. Encounter for screening for diabetes mellitus  Due for updated labs.  - Comp Metabolic Panel; Future      Health Maintenance: Completed    Return in about 4 weeks (around 9/1/2022) for Medication F/U.    Please note that this dictation was created using voice recognition software. I have made every reasonable attempt to correct obvious errors, but I expect that there are errors of grammar and possibly content that I did not discover before finalizing the note.    CHRISTINA Rivero  [facility]

## 2022-08-13 LAB
25(OH)D3+25(OH)D2 SERPL-MCNC: 56.9 NG/ML (ref 30–100)
ALBUMIN SERPL-MCNC: 4.7 G/DL (ref 3.8–4.9)
ALBUMIN/GLOB SERPL: 2 {RATIO} (ref 1.2–2.2)
ALP SERPL-CCNC: 55 IU/L (ref 44–121)
ALT SERPL-CCNC: 20 IU/L (ref 0–32)
AST SERPL-CCNC: 18 IU/L (ref 0–40)
BILIRUB SERPL-MCNC: 0.4 MG/DL (ref 0–1.2)
BUN SERPL-MCNC: 11 MG/DL (ref 6–24)
BUN/CREAT SERPL: 15 (ref 9–23)
CALCIUM SERPL-MCNC: 10 MG/DL (ref 8.7–10.2)
CHLORIDE SERPL-SCNC: 100 MMOL/L (ref 96–106)
CHOLEST SERPL-MCNC: 184 MG/DL (ref 100–199)
CO2 SERPL-SCNC: 25 MMOL/L (ref 20–29)
CREAT SERPL-MCNC: 0.71 MG/DL (ref 0.57–1)
EGFRCR SERPLBLD CKD-EPI 2021: 98 ML/MIN/1.73
ESTRADIOL SERPL-MCNC: 114 PG/ML
GLOBULIN SER CALC-MCNC: 2.4 G/DL (ref 1.5–4.5)
GLUCOSE SERPL-MCNC: 97 MG/DL (ref 65–99)
HDLC SERPL-MCNC: 82 MG/DL
LABORATORY COMMENT REPORT: NORMAL
LDLC SERPL CALC-MCNC: 86 MG/DL (ref 0–99)
POTASSIUM SERPL-SCNC: 4.7 MMOL/L (ref 3.5–5.2)
PROGEST SERPL-MCNC: <0.1 NG/ML
PROT SERPL-MCNC: 7.1 G/DL (ref 6–8.5)
SODIUM SERPL-SCNC: 139 MMOL/L (ref 134–144)
TESTOST SERPL-MCNC: 20 NG/DL
TRIGL SERPL-MCNC: 90 MG/DL (ref 0–149)
VLDLC SERPL CALC-MCNC: 16 MG/DL (ref 5–40)

## 2022-09-07 ENCOUNTER — TELEMEDICINE (OUTPATIENT)
Dept: MEDICAL GROUP | Facility: PHYSICIAN GROUP | Age: 57
End: 2022-09-07
Payer: OTHER GOVERNMENT

## 2022-09-07 VITALS — WEIGHT: 165 LBS | HEIGHT: 66 IN | BODY MASS INDEX: 26.52 KG/M2

## 2022-09-07 DIAGNOSIS — K14.6 BURNING MOUTH SYNDROME: ICD-10-CM

## 2022-09-07 DIAGNOSIS — F41.9 ANXIETY DISORDER, UNSPECIFIED TYPE: ICD-10-CM

## 2022-09-07 PROCEDURE — 99214 OFFICE O/P EST MOD 30 MIN: CPT | Performed by: NURSE PRACTITIONER

## 2022-09-07 RX ORDER — ALPRAZOLAM 0.5 MG/1
0.5 TABLET ORAL 2 TIMES DAILY PRN
Qty: 45 TABLET | Refills: 0 | Status: SHIPPED | OUTPATIENT
Start: 2022-11-06 | End: 2022-09-07 | Stop reason: SDUPTHER

## 2022-09-07 RX ORDER — DULOXETIN HYDROCHLORIDE 30 MG/1
CAPSULE, DELAYED RELEASE ORAL
COMMUNITY
Start: 2022-08-21 | End: 2022-09-07

## 2022-09-07 RX ORDER — ALPRAZOLAM 0.5 MG/1
0.5 TABLET ORAL 2 TIMES DAILY PRN
Qty: 45 TABLET | Refills: 0 | Status: SHIPPED | OUTPATIENT
Start: 2022-10-07 | End: 2022-11-06

## 2022-09-07 RX ORDER — ALPRAZOLAM 0.5 MG/1
0.5 TABLET ORAL 2 TIMES DAILY PRN
Qty: 45 TABLET | Refills: 0 | Status: SHIPPED | OUTPATIENT
Start: 2022-09-07 | End: 2022-10-07

## 2022-09-07 RX ORDER — ALPRAZOLAM 0.5 MG/1
0.5 TABLET ORAL 2 TIMES DAILY PRN
Qty: 45 TABLET | Refills: 0 | Status: SHIPPED | OUTPATIENT
Start: 2022-11-06 | End: 2022-11-14 | Stop reason: SDUPTHER

## 2022-09-07 NOTE — PROGRESS NOTES
Virtual Visit: Established Patient   This visit was conducted via Zoom using secure and encrypted videoconferencing technology. The patient was in a private location in the state of [place].    The patient's identity was confirmed and verbal consent was obtained for this virtual visit.    Subjective  Chief Complaint  Medication Follow Up    History of Present Illness  Christelle Kennedy is a 57 y.o. female. This established patient is here today to follow up on medications for burning mouth and anxiety.    Burning mouth syndrome  Chronic and ongoing. She states that taking the Clonazepam did not help her at all. She states that taking the medication just made her more groggy. She states that the nerves in her mouth continue to have a burning sensation. She states that she has an appointment with her dentist soon and she will discuss this issue with them in more detail.    Anxiety disorder, unspecified  Chronic and ongoing. She was switched over to Clonazepam because of her burning mouth treatment. But since the burning mouth did not resolve or improve should would like to go back on her Xanax dosage.    Past Medical History    Allergies: Latex and Tetracycline  Past Medical History:   Diagnosis Date    Allergy     Anxiety     Arthritis     Cancer (HCC)     Cervical    Depression     Headache(784.0)     Hyperlipidemia     Hypertension     Migraine     Migraine without aura, without mention of intractable migraine without mention of status migrainosus     Psoriasis     Urinary tract infection, site not specified      Past Surgical History:   Procedure Laterality Date    CHOLECYSTECTOMY  2001    RHINOPLASTY  1999    ABDOMINAL SUBTOTAL HYSTERECTOMY  1996    dysplasia    TONSILLECTOMY       Current Outpatient Medications Ordered in Epic   Medication Sig Dispense Refill    ALPRAZolam (XANAX) 0.5 MG Tab Take 1 Tablet by mouth 2 times a day as needed for Anxiety for up to 30 days. 45 Tablet 0    [START ON 10/7/2022]  ALPRAZolam (XANAX) 0.5 MG Tab Take 1 Tablet by mouth 2 times a day as needed for Anxiety for up to 30 days. 45 Tablet 0    [START ON 11/6/2022] ALPRAZolam (XANAX) 0.5 MG Tab Take 1 Tablet by mouth 2 times a day as needed for Anxiety for up to 30 days. 45 Tablet 0    rosuvastatin (CRESTOR) 10 MG Tab TAKE 1 TABLET EVERY EVENING 90 Tablet 3    estradiol (ESTRACE) 2 MG Tab Take 1 Tablet by mouth every day. 90 Tablet 1    nitrofurantoin (MACROBID) 100 MG Cap       acyclovir (ZOVIRAX) 800 MG Tab Take 1 Tablet by mouth 3 times a day. 270 Tablet 2    ibuprofen (MOTRIN) 800 MG Tab       cyclobenzaprine (FLEXERIL) 10 mg Tab TAKE 1 TABLET BY MOUTH EVERY DAY AS NEEDED FOR FLARES      Buprenorphine HCl (BELBUCA) 600 MCG FILM Place  into mouth between cheek and gum.      oxycodone immediate release (ROXICODONE) 10 MG immediate release tablet Take 1 Tab by mouth every 6 hours as needed for Moderate Pain. 120 Tab 0     No current Epic-ordered facility-administered medications on file.     Family History:    Family History   Problem Relation Age of Onset    Diabetes Mother     Hypertension Mother     Cancer Mother 62        breast    Hyperlipidemia Mother     Heart Disease Father     Stroke Paternal Grandmother 82    Hypertension Sister     Thyroid Sister     Lung Disease Neg Hx     Alcohol/Drug Neg Hx       Personal/Social History:    Social History     Tobacco Use    Smoking status: Every Day     Packs/day: 0.50     Years: 40.00     Pack years: 20.00     Types: Cigarettes    Smokeless tobacco: Never    Tobacco comments:     encouraged to quit, tried quitting   Vaping Use    Vaping Use: Never used   Substance Use Topics    Alcohol use: No     Alcohol/week: 0.0 oz     Comment: occasion 4 x a yr    Drug use: No     Comment: tired mj in past.     Social History     Social History Narrative    Not on file        Review of Systems:   General: Negative for fever/chills and expected weight change.   Respiratory:  Negative for cough and  "dyspnea.    Cardiovascular:  Negative for chest pain and palpitations.  Gastrointestinal:  Negative for nausea/vomiting, changes in bowel habits, and abdominal pain.   Skin:  Negative for rash.   Neurological:  Negative for numbness/tingling and headaches.     Objective  Physical Exam:   Ht 1.676 m (5' 6\")   Wt 74.8 kg (165 lb)  Body mass index is 26.63 kg/m².  Constitutional: Alert, no distress, well-groomed.  Skin: No rashes in visible areas.  Eye: Round. Conjunctiva clear, lids normal. No icterus.   ENMT: Lips pink without lesions, good dentition, moist mucous membranes. Phonation normal.  Neck: No masses, no thyromegaly on visible inspection. Moves freely without pain.  Respiratory: Unlabored respiratory effort, no cough or audible wheeze  Psych: Alert and oriented x3, normal affect and mood.       Assessment/Plan  1. Burning mouth syndrome  Chronic and ongoing.  Discussed with her to follow up with her Dentist about her symptoms, she verbalized understanding.    2. Anxiety disorder, unspecified type  Chronic and ongoing.  Continue to take Alprazolam 0.5 mg as needed up to twice a day.  - ALPRAZolam (XANAX) 0.5 MG Tab; Take 1 Tablet by mouth 2 times a day as needed for Anxiety for up to 30 days.  Dispense: 45 Tablet; Refill: 0  - ALPRAZolam (XANAX) 0.5 MG Tab; Take 1 Tablet by mouth 2 times a day as needed for Anxiety for up to 30 days.  Dispense: 45 Tablet; Refill: 0  - ALPRAZolam (XANAX) 0.5 MG Tab; Take 1 Tablet by mouth 2 times a day as needed for Anxiety for up to 30 days.  Dispense: 45 Tablet; Refill: 0    Obtained and reviewed patient utilization report from [facility] pharmacy database on 9/7/22 at 0957 prior to writing prescription for controlled substance II, III or IV per [place] bill . Based on assessment of the report, the prescription is medically necessary.        Health Maintenance: Completed    Return in about 3 months (around 12/7/2022) for Medication Refill.    Please note that this " dictation was created using voice recognition software. I have made every reasonable attempt to correct obvious errors, but I expect that there are errors of grammar and possibly content that I did not discover before finalizing the note.    CHRISTINA Rivero  [facility]

## 2022-09-07 NOTE — ASSESSMENT & PLAN NOTE
Chronic and ongoing. She was switched over to Clonazepam because of her burning mouth treatment. But since the burning mouth did not resolve or improve should would like to go back on her Xanax dosage.

## 2022-09-07 NOTE — ASSESSMENT & PLAN NOTE
Chronic and ongoing. She states that taking the Clonazepam did not help her at all. She states that taking the medication just made her more groggy. She states that the nerves in her mouth continue to have a burning sensation. She states that she has an appointment with her dentist soon and she will discuss this issue with them in more detail.

## 2022-10-12 DIAGNOSIS — N95.1 MENOPAUSAL SYMPTOMS: ICD-10-CM

## 2022-10-12 RX ORDER — ESTRADIOL 2 MG/1
TABLET ORAL
Qty: 90 TABLET | Refills: 3 | Status: SHIPPED | OUTPATIENT
Start: 2022-10-12 | End: 2023-10-10 | Stop reason: SDUPTHER

## 2022-11-14 DIAGNOSIS — F41.9 ANXIETY DISORDER, UNSPECIFIED TYPE: ICD-10-CM

## 2022-11-14 RX ORDER — ALPRAZOLAM 0.5 MG/1
0.5 TABLET ORAL 2 TIMES DAILY PRN
Qty: 45 TABLET | Refills: 0 | Status: SHIPPED
Start: 2022-11-14 | End: 2022-12-08

## 2022-11-14 NOTE — PROGRESS NOTES
1. Anxiety disorder, unspecified type  - ALPRAZolam (XANAX) 0.5 MG Tab; Take 1 Tablet by mouth 2 times a day as needed for Anxiety for up to 30 days.  Dispense: 45 Tablet; Refill: 0

## 2022-12-08 ENCOUNTER — OFFICE VISIT (OUTPATIENT)
Dept: MEDICAL GROUP | Facility: PHYSICIAN GROUP | Age: 57
End: 2022-12-08
Payer: OTHER GOVERNMENT

## 2022-12-08 VITALS
HEART RATE: 82 BPM | BODY MASS INDEX: 27.48 KG/M2 | WEIGHT: 171 LBS | RESPIRATION RATE: 18 BRPM | HEIGHT: 66 IN | OXYGEN SATURATION: 96 % | SYSTOLIC BLOOD PRESSURE: 162 MMHG | TEMPERATURE: 98 F | DIASTOLIC BLOOD PRESSURE: 74 MMHG

## 2022-12-08 DIAGNOSIS — F41.9 ANXIETY DISORDER, UNSPECIFIED TYPE: ICD-10-CM

## 2022-12-08 DIAGNOSIS — R03.0 ELEVATED BLOOD PRESSURE READING: ICD-10-CM

## 2022-12-08 PROCEDURE — 99214 OFFICE O/P EST MOD 30 MIN: CPT | Performed by: NURSE PRACTITIONER

## 2022-12-08 RX ORDER — ALPRAZOLAM 0.5 MG/1
0.5 TABLET ORAL 2 TIMES DAILY PRN
Qty: 45 TABLET | Refills: 0 | Status: SHIPPED | OUTPATIENT
Start: 2022-12-14 | End: 2023-01-13

## 2022-12-08 RX ORDER — PROPRANOLOL HYDROCHLORIDE 10 MG/1
10 TABLET ORAL 2 TIMES DAILY
Qty: 60 TABLET | Refills: 1 | Status: SHIPPED | OUTPATIENT
Start: 2022-12-08 | End: 2023-01-10 | Stop reason: SDUPTHER

## 2022-12-08 RX ORDER — ALPRAZOLAM 0.5 MG/1
0.5 TABLET ORAL 2 TIMES DAILY PRN
Qty: 45 TABLET | Refills: 0 | Status: CANCELLED | OUTPATIENT
Start: 2022-12-08 | End: 2023-01-07

## 2022-12-08 NOTE — ASSESSMENT & PLAN NOTE
Chronic and ongoing. Currently taking Xanax 0.5 mg up to twice a day as needed. She states that this week has been stressful because of her mother in law passing away. She did have an elevated blood pressure at her pain management doctor. Denies any side effects from the Xanax.

## 2022-12-09 NOTE — PROGRESS NOTES
Subjective  Chief Complaint  Medication Refill    History of Present Illness  Christelle Kennedy is a 57 y.o. female. This established patient is here today for her medication refill for her anxiety.    Anxiety disorder, unspecified  Chronic and ongoing. Currently taking Xanax 0.5 mg up to twice a day as needed. She states that this week has been stressful because of her mother in law passing away. She did have an elevated blood pressure at her pain management doctor. Denies any side effects from the Xanax.    Past Medical History    Allergies: Latex and Tetracycline  Past Medical History:   Diagnosis Date    Allergy     Anxiety     Arthritis     Cancer (HCC)     Cervical    Depression     Headache(784.0)     Hyperlipidemia     Hypertension     Migraine     Migraine without aura, without mention of intractable migraine without mention of status migrainosus     Psoriasis     Urinary tract infection, site not specified      Past Surgical History:   Procedure Laterality Date    CHOLECYSTECTOMY  2001    RHINOPLASTY  1999    ABDOMINAL SUBTOTAL HYSTERECTOMY  1996    dysplasia    TONSILLECTOMY       Current Outpatient Medications Ordered in Epic   Medication Sig Dispense Refill    propranolol (INDERAL) 10 MG Tab Take 1 Tablet by mouth 2 times a day. 60 Tablet 1    [START ON 12/14/2022] ALPRAZolam (XANAX) 0.5 MG Tab Take 1 Tablet by mouth 2 times a day as needed for Anxiety for up to 30 days. 45 Tablet 0    estradiol (ESTRACE) 2 MG Tab TAKE 1 TABLET DAILY 90 Tablet 3    rosuvastatin (CRESTOR) 10 MG Tab TAKE 1 TABLET EVERY EVENING 90 Tablet 3    acyclovir (ZOVIRAX) 800 MG Tab Take 1 Tablet by mouth 3 times a day. 270 Tablet 2    ibuprofen (MOTRIN) 800 MG Tab       cyclobenzaprine (FLEXERIL) 10 mg Tab TAKE 1 TABLET BY MOUTH EVERY DAY AS NEEDED FOR FLARES      Buprenorphine HCl (BELBUCA) 600 MCG FILM Place  into mouth between cheek and gum.      oxycodone immediate release (ROXICODONE) 10 MG immediate release tablet Take 1 Tab  "by mouth every 6 hours as needed for Moderate Pain. 120 Tab 0     No current Epic-ordered facility-administered medications on file.     Family History:    Family History   Problem Relation Age of Onset    Diabetes Mother     Hypertension Mother     Cancer Mother 62        breast    Hyperlipidemia Mother     Heart Disease Father     Stroke Paternal Grandmother 82    Hypertension Sister     Thyroid Sister     Lung Disease Neg Hx     Alcohol/Drug Neg Hx       Personal/Social History:    Social History     Tobacco Use    Smoking status: Every Day     Packs/day: 0.50     Years: 40.00     Pack years: 20.00     Types: Cigarettes    Smokeless tobacco: Never    Tobacco comments:     encouraged to quit, tried quitting   Vaping Use    Vaping Use: Never used   Substance Use Topics    Alcohol use: No     Alcohol/week: 0.0 oz     Comment: occasion 4 x a yr    Drug use: No     Comment: tired mj in past.     Social History     Social History Narrative    Not on file      Review of Systems:   General: Negative for fever/chills and unexpected weight change.   Eyes:  Negative for vision changes, eye pain.  Respiratory:  Negative for cough and dyspnea.    Cardiovascular:  Negative for chest pain and palpitations.  Neurological:  Negative for numbness/tingling and headaches.       Objective  Physical Exam:   BP (!) 162/74 (BP Location: Left arm, Patient Position: Sitting)   Pulse 82   Temp 36.7 °C (98 °F) (Temporal)   Resp 18   Ht 1.676 m (5' 6\")   Wt 77.6 kg (171 lb)   SpO2 96%  Body mass index is 27.6 kg/m².  General:  Alert and oriented.  Well appearing.  NAD  Neck: Supple without JVD. No lymphadenopathy.  Pulmonary:  Normal effort.  Clear to ausculation without rales, ronchi, or wheezing.  Cardiovascular:  Regular rate and rhythm without murmur, rubs or gallop.   Skin:  Warm and dry.  No obvious lesions.  Musculoskeletal:  No extremity cyanosis, clubbing, or edema.      Assessment/Plan  1. Anxiety disorder, unspecified " type  Chronic and ongoing.  Continue to take Xanax 0.5 mg as needed up to twice a day for anxiety.  - ALPRAZolam (XANAX) 0.5 MG Tab; Take 1 Tablet by mouth 2 times a day as needed for Anxiety for up to 30 days.  Dispense: 45 Tablet; Refill: 0    Obtained and reviewed patient utilization report from [facility] pharmacy database on 12/8/2022 at 1140 prior to writing prescription for controlled substance II, III or IV per [place] bill . Based on assessment of the report, the prescription is medically necessary.    2. Elevated blood pressure reading  New diagnosis.  Discussed in length with her about her elevated blood pressure reading and a lot of it can be from her being under a lot of recent stress. She recently loss her mother in law and is in charge of estate. Discussed that with her anxiety and now elevated blood pressure reading that I want to start her on a medication to help with both, she is agreeable.  Discussed Propranolol risks, benefits and side effects, she verbalized understanding.  - propranolol (INDERAL) 10 MG Tab; Take 1 Tablet by mouth 2 times a day.  Dispense: 60 Tablet; Refill: 1      Health Maintenance: Discussed with patient.    Return in about 1 month (around 1/10/2023) for Medication F/U.    Please note that this dictation was created using voice recognition software. I have made every reasonable attempt to correct obvious errors, but I expect that there are errors of grammar and possibly content that I did not discover before finalizing the note.    CHRISTINA Rivero  [facility]

## 2023-01-10 ENCOUNTER — OFFICE VISIT (OUTPATIENT)
Dept: MEDICAL GROUP | Facility: PHYSICIAN GROUP | Age: 58
End: 2023-01-10
Payer: OTHER GOVERNMENT

## 2023-01-10 VITALS
WEIGHT: 171 LBS | HEIGHT: 66 IN | HEART RATE: 59 BPM | RESPIRATION RATE: 20 BRPM | OXYGEN SATURATION: 97 % | DIASTOLIC BLOOD PRESSURE: 86 MMHG | BODY MASS INDEX: 27.48 KG/M2 | TEMPERATURE: 98.3 F | SYSTOLIC BLOOD PRESSURE: 140 MMHG

## 2023-01-10 DIAGNOSIS — F41.9 ANXIETY DISORDER, UNSPECIFIED TYPE: ICD-10-CM

## 2023-01-10 DIAGNOSIS — I10 ESSENTIAL HYPERTENSION: ICD-10-CM

## 2023-01-10 PROCEDURE — 99214 OFFICE O/P EST MOD 30 MIN: CPT | Performed by: NURSE PRACTITIONER

## 2023-01-10 RX ORDER — PROPRANOLOL HYDROCHLORIDE 10 MG/1
10 TABLET ORAL 2 TIMES DAILY
Qty: 180 TABLET | Refills: 1 | Status: SHIPPED | OUTPATIENT
Start: 2023-01-10 | End: 2023-05-23 | Stop reason: SDUPTHER

## 2023-01-10 RX ORDER — ALPRAZOLAM 0.5 MG/1
0.5 TABLET ORAL 2 TIMES DAILY PRN
Qty: 45 TABLET | Refills: 0 | Status: SHIPPED | OUTPATIENT
Start: 2023-02-12 | End: 2023-03-14

## 2023-01-10 RX ORDER — ALPRAZOLAM 0.5 MG/1
0.5 TABLET ORAL 2 TIMES DAILY PRN
Qty: 45 TABLET | Refills: 0 | Status: SHIPPED | OUTPATIENT
Start: 2023-01-13 | End: 2023-02-12

## 2023-01-10 ASSESSMENT — PATIENT HEALTH QUESTIONNAIRE - PHQ9: CLINICAL INTERPRETATION OF PHQ2 SCORE: 0

## 2023-01-10 NOTE — ASSESSMENT & PLAN NOTE
"Currently taking Propranolol 10 mg BID as directed.   Reports diet is \"okay\".   She is following a low-salt diet.  She is not exercising regularly.  She is not taking baby aspirin daily.   She is not monitoring BP at home.   Denies symptoms low BP: light-headed, tunnel-vision, unusual fatigue, dizziness.  Denies symptoms high BP: pounding headache, visual changes, palpitations, flushed face.   Denies chest pain, shortness of breath, dyspnea on exertion.   Denies medicine side effects: unusual fatigue, slow heartbeat, foot/leg swelling, cough.  "

## 2023-01-10 NOTE — ASSESSMENT & PLAN NOTE
Chronic and ongoing. Currently taking Xanax 0.5 mg up to twice a day as needed. She states that the medication does work well for her anxiety. Denies any side effects. Requesting refills today.

## 2023-01-10 NOTE — PROGRESS NOTES
"Subjective  Chief Complaint  Medication Follow Up    History of Present Illness  Christelle Kennedy is a 57 y.o. female. This established patient is here today to follow up on her blood pressure medication.    Essential hypertension  Currently taking Propranolol 10 mg BID as directed.   Reports diet is \"okay\".   She is following a low-salt diet.  She is not exercising regularly.  She is not taking baby aspirin daily.   She is not monitoring BP at home.   Denies symptoms low BP: light-headed, tunnel-vision, unusual fatigue, dizziness.  Denies symptoms high BP: pounding headache, visual changes, palpitations, flushed face.   Denies chest pain, shortness of breath, dyspnea on exertion.   Denies medicine side effects: unusual fatigue, slow heartbeat, foot/leg swelling, cough.    Anxiety disorder, unspecified  Chronic and ongoing. Currently taking Xanax 0.5 mg up to twice a day as needed. She states that the medication does work well for her anxiety. Denies any side effects. Requesting refills today.    Past Medical History    Allergies: Latex and Tetracycline  Past Medical History:   Diagnosis Date    Allergy     Anxiety     Arthritis     Cancer (HCC)     Cervical    Depression     Headache(784.0)     Hyperlipidemia     Hypertension     Migraine     Migraine without aura, without mention of intractable migraine without mention of status migrainosus     Psoriasis     Urinary tract infection, site not specified      Past Surgical History:   Procedure Laterality Date    CHOLECYSTECTOMY  2001    RHINOPLASTY  1999    ABDOMINAL SUBTOTAL HYSTERECTOMY  1996    dysplasia    TONSILLECTOMY       Current Outpatient Medications Ordered in Epic   Medication Sig Dispense Refill    propranolol (INDERAL) 10 MG Tab Take 1 Tablet by mouth 2 times a day. 180 Tablet 1    [START ON 1/13/2023] ALPRAZolam (XANAX) 0.5 MG Tab Take 1 Tablet by mouth 2 times a day as needed for Anxiety for up to 30 days. 45 Tablet 0    [START ON 2/12/2023] " ALPRAZolam (XANAX) 0.5 MG Tab Take 1 Tablet by mouth 2 times a day as needed for Anxiety for up to 30 days. 45 Tablet 0    ALPRAZolam (XANAX) 0.5 MG Tab Take 1 Tablet by mouth 2 times a day as needed for Anxiety for up to 30 days. 45 Tablet 0    estradiol (ESTRACE) 2 MG Tab TAKE 1 TABLET DAILY 90 Tablet 3    rosuvastatin (CRESTOR) 10 MG Tab TAKE 1 TABLET EVERY EVENING 90 Tablet 3    acyclovir (ZOVIRAX) 800 MG Tab Take 1 Tablet by mouth 3 times a day. 270 Tablet 2    ibuprofen (MOTRIN) 800 MG Tab       cyclobenzaprine (FLEXERIL) 10 mg Tab TAKE 1 TABLET BY MOUTH EVERY DAY AS NEEDED FOR FLARES      Buprenorphine HCl (BELBUCA) 600 MCG FILM Place  into mouth between cheek and gum.      oxycodone immediate release (ROXICODONE) 10 MG immediate release tablet Take 1 Tab by mouth every 6 hours as needed for Moderate Pain. 120 Tab 0     No current Epic-ordered facility-administered medications on file.     Family History:    Family History   Problem Relation Age of Onset    Diabetes Mother     Hypertension Mother     Cancer Mother 62        breast    Hyperlipidemia Mother     Heart Disease Father     Stroke Paternal Grandmother 82    Hypertension Sister     Thyroid Sister     Lung Disease Neg Hx     Alcohol/Drug Neg Hx       Personal/Social History:    Social History     Tobacco Use    Smoking status: Every Day     Packs/day: 0.50     Years: 40.00     Pack years: 20.00     Types: Cigarettes    Smokeless tobacco: Never    Tobacco comments:     encouraged to quit, tried quitting   Vaping Use    Vaping Use: Never used   Substance Use Topics    Alcohol use: No     Alcohol/week: 0.0 oz     Comment: occasion 4 x a yr    Drug use: No     Comment: tired mj in past.     Social History     Social History Narrative    Not on file      Review of Systems:   General: Negative for fever/chills and unexpected weight change.   Respiratory:  Negative for cough and dyspnea.    Cardiovascular:  Negative for chest pain and  "palpitations.  Musculoskeletal:  Negative for myalgias.   Skin:  Negative for rash.     Objective  Physical Exam:   BP (!) 140/86 (BP Location: Left arm, Patient Position: Sitting, BP Cuff Size: Adult)   Pulse (!) 59   Temp 36.8 °C (98.3 °F) (Temporal)   Resp 20   Ht 1.676 m (5' 6\")   Wt 77.6 kg (171 lb)   SpO2 97%  Body mass index is 27.6 kg/m².  General:  Alert and oriented.  Well appearing.  NAD  Neck: Supple without JVD. No lymphadenopathy.  Pulmonary:  Normal effort.  Clear to ausculation without rales, ronchi, or wheezing.  Cardiovascular:  Regular rate and rhythm without murmur, rubs or gallop.   Skin:  Warm and dry.  No obvious lesions.  Musculoskeletal:  No extremity cyanosis, clubbing, or edema.        Assessment/Plan  1. Essential hypertension  Chronic and ongoing.  Continue to take Propranolol 10 mg BID.  Educated on a healthy diet and exercise.  Educated to check blood pressure daily at home.  - propranolol (INDERAL) 10 MG Tab; Take 1 Tablet by mouth 2 times a day.  Dispense: 180 Tablet; Refill: 1    2. Anxiety disorder, unspecified type  Chronic and ongoing.  Continue to take Xanax 0.5 mg BID as needed for anxiety.  - ALPRAZolam (XANAX) 0.5 MG Tab; Take 1 Tablet by mouth 2 times a day as needed for Anxiety for up to 30 days.  Dispense: 45 Tablet; Refill: 0  - ALPRAZolam (XANAX) 0.5 MG Tab; Take 1 Tablet by mouth 2 times a day as needed for Anxiety for up to 30 days.  Dispense: 45 Tablet; Refill: 0    Obtained and reviewed patient utilization report from [facility] pharmacy database on 1/10/2023 at 1142 prior to writing prescription for controlled substance II, III or IV per [place] bill . Based on assessment of the report, the prescription is medically necessary.      Health Maintenance: Completed    Return in about 2 months (around 3/10/2023) for Medication Refill.    Please note that this dictation was created using voice recognition software. I have made every reasonable attempt to " correct obvious errors, but I expect that there are errors of grammar and possibly content that I did not discover before finalizing the note.    CHRISTINA Rivero  [facility]

## 2023-03-10 ENCOUNTER — OFFICE VISIT (OUTPATIENT)
Dept: MEDICAL GROUP | Facility: PHYSICIAN GROUP | Age: 58
End: 2023-03-10
Payer: OTHER GOVERNMENT

## 2023-03-10 VITALS
OXYGEN SATURATION: 98 % | HEART RATE: 64 BPM | WEIGHT: 177.38 LBS | BODY MASS INDEX: 28.51 KG/M2 | RESPIRATION RATE: 20 BRPM | DIASTOLIC BLOOD PRESSURE: 74 MMHG | SYSTOLIC BLOOD PRESSURE: 124 MMHG | TEMPERATURE: 98.1 F | HEIGHT: 66 IN

## 2023-03-10 DIAGNOSIS — F41.9 ANXIETY DISORDER, UNSPECIFIED TYPE: ICD-10-CM

## 2023-03-10 PROCEDURE — 99213 OFFICE O/P EST LOW 20 MIN: CPT | Performed by: NURSE PRACTITIONER

## 2023-03-10 RX ORDER — OMEPRAZOLE 20 MG/1
CAPSULE, DELAYED RELEASE ORAL
COMMUNITY
Start: 2023-01-25 | End: 2023-11-01

## 2023-03-10 RX ORDER — ALPRAZOLAM 0.5 MG/1
0.5 TABLET ORAL 2 TIMES DAILY PRN
Qty: 50 TABLET | Refills: 0 | Status: SHIPPED | OUTPATIENT
Start: 2023-03-16 | End: 2023-04-15

## 2023-03-10 RX ORDER — LIDOCAINE HYDROCHLORIDE 20 MG/ML
SOLUTION OROPHARYNGEAL
COMMUNITY
Start: 2023-01-17 | End: 2023-08-09

## 2023-03-10 RX ORDER — ALPRAZOLAM 0.5 MG/1
0.5 TABLET ORAL 2 TIMES DAILY PRN
Qty: 50 TABLET | Refills: 0 | Status: SHIPPED | OUTPATIENT
Start: 2023-05-15 | End: 2023-05-23 | Stop reason: SDUPTHER

## 2023-03-10 RX ORDER — ALPRAZOLAM 0.5 MG/1
0.5 TABLET ORAL 2 TIMES DAILY PRN
Qty: 50 TABLET | Refills: 0 | Status: SHIPPED | OUTPATIENT
Start: 2023-04-15 | End: 2023-05-15

## 2023-03-10 RX ORDER — PREGABALIN 75 MG/1
CAPSULE ORAL
COMMUNITY
Start: 2023-03-07 | End: 2023-03-10

## 2023-03-10 RX ORDER — PREGABALIN 75 MG/1
CAPSULE ORAL
COMMUNITY
Start: 2023-02-06 | End: 2023-05-23

## 2023-03-10 RX ORDER — PREGABALIN 50 MG/1
CAPSULE ORAL
COMMUNITY
Start: 2023-01-11 | End: 2023-03-10

## 2023-03-10 RX ORDER — DULOXETIN HYDROCHLORIDE 30 MG/1
CAPSULE, DELAYED RELEASE ORAL
COMMUNITY
Start: 2023-03-07 | End: 2024-02-22 | Stop reason: SDUPTHER

## 2023-03-10 NOTE — PROGRESS NOTES
Subjective  Chief Complaint  Medication Refill    History of Present Illness  Christelle Kennedy is a 58 y.o. female. This established patient is here today to refill her anxiety medication.    Anxiety disorder, unspecified  Chronic and ongoing. Currently taking Xanax 0.5 mg up to twice a day as needed. She states that the medication does work well for her anxiety. Denies any side effects. Requesting refills today.    Past Medical History    Allergies: Latex and Tetracycline  Past Medical History:   Diagnosis Date    Allergy     Anxiety     Arthritis     Cancer (HCC)     Cervical    Depression     Headache(784.0)     Hyperlipidemia     Hypertension     Migraine     Migraine without aura, without mention of intractable migraine without mention of status migrainosus     Psoriasis     Urinary tract infection, site not specified      Past Surgical History:   Procedure Laterality Date    CHOLECYSTECTOMY  2001    RHINOPLASTY  1999    ABDOMINAL SUBTOTAL HYSTERECTOMY  1996    dysplasia    TONSILLECTOMY       Current Outpatient Medications Ordered in Epic   Medication Sig Dispense Refill    lidocaine (XYLOCAINE) 2 % Solution       omeprazole (PRILOSEC) 20 MG delayed-release capsule       pregabalin (LYRICA) 75 MG Cap       [START ON 3/16/2023] ALPRAZolam (XANAX) 0.5 MG Tab Take 1 Tablet by mouth 2 times a day as needed for Anxiety for up to 30 days. 50 Tablet 0    [START ON 4/15/2023] ALPRAZolam (XANAX) 0.5 MG Tab Take 1 Tablet by mouth 2 times a day as needed for Anxiety for up to 30 days. 50 Tablet 0    [START ON 5/15/2023] ALPRAZolam (XANAX) 0.5 MG Tab Take 1 Tablet by mouth 2 times a day as needed for Anxiety for up to 30 days. 50 Tablet 0    propranolol (INDERAL) 10 MG Tab Take 1 Tablet by mouth 2 times a day. 180 Tablet 1    ALPRAZolam (XANAX) 0.5 MG Tab Take 1 Tablet by mouth 2 times a day as needed for Anxiety for up to 30 days. 45 Tablet 0    estradiol (ESTRACE) 2 MG Tab TAKE 1 TABLET DAILY 90 Tablet 3     "rosuvastatin (CRESTOR) 10 MG Tab TAKE 1 TABLET EVERY EVENING 90 Tablet 3    acyclovir (ZOVIRAX) 800 MG Tab Take 1 Tablet by mouth 3 times a day. 270 Tablet 2    ibuprofen (MOTRIN) 800 MG Tab       cyclobenzaprine (FLEXERIL) 10 mg Tab TAKE 1 TABLET BY MOUTH EVERY DAY AS NEEDED FOR FLARES      Buprenorphine HCl (BELBUCA) 600 MCG FILM Place  into mouth between cheek and gum.      oxycodone immediate release (ROXICODONE) 10 MG immediate release tablet Take 1 Tab by mouth every 6 hours as needed for Moderate Pain. 120 Tab 0    DULoxetine (CYMBALTA) 30 MG Cap DR Particles        No current Epic-ordered facility-administered medications on file.     Family History:    Family History   Problem Relation Age of Onset    Diabetes Mother     Hypertension Mother     Cancer Mother 62        breast    Hyperlipidemia Mother     Heart Disease Father     Stroke Paternal Grandmother 82    Hypertension Sister     Thyroid Sister     Lung Disease Neg Hx     Alcohol/Drug Neg Hx       Personal/Social History:    Social History     Tobacco Use    Smoking status: Every Day     Packs/day: 0.50     Years: 40.00     Pack years: 20.00     Types: Cigarettes    Smokeless tobacco: Never    Tobacco comments:     encouraged to quit, tried quitting   Vaping Use    Vaping Use: Never used   Substance Use Topics    Alcohol use: No     Alcohol/week: 0.0 oz     Comment: occasion 4 x a yr    Drug use: No     Comment: tired mj in past.     Social History     Social History Narrative    Not on file      Review of Systems:   General: Negative for fever/chills and unexpected weight change.   Respiratory:  Negative for cough and dyspnea.    Cardiovascular:  Negative for chest pain and palpitations.  Musculoskeletal:  Negative for myalgias.   Skin:  Negative for rash.     Objective  Physical Exam:   /74 (BP Location: Left arm, Patient Position: Sitting, BP Cuff Size: Adult)   Pulse 64   Temp 36.7 °C (98.1 °F) (Temporal)   Resp 20   Ht 1.676 m (5' 6\")   " Wt 80.5 kg (177 lb 6 oz)   SpO2 98%  Body mass index is 28.63 kg/m².  General:  Alert and oriented.  Well appearing.  NAD  Neck: Supple without JVD. No lymphadenopathy.  Pulmonary:  Normal effort.  Clear to ausculation without rales, ronchi, or wheezing.  Cardiovascular:  Regular rate and rhythm without murmur, rubs or gallop.   Skin:  Warm and dry.  No obvious lesions.  Musculoskeletal:  No extremity cyanosis, clubbing, or edema.      Assessment/Plan  1. Anxiety disorder, unspecified type  Chronic and ongoing.  Continue to take Xanax 0.5 mg as needed up to twice a day for anxiety.  - ALPRAZolam (XANAX) 0.5 MG Tab; Take 1 Tablet by mouth 2 times a day as needed for Anxiety for up to 30 days.  Dispense: 50 Tablet; Refill: 0  - ALPRAZolam (XANAX) 0.5 MG Tab; Take 1 Tablet by mouth 2 times a day as needed for Anxiety for up to 30 days.  Dispense: 50 Tablet; Refill: 0  - ALPRAZolam (XANAX) 0.5 MG Tab; Take 1 Tablet by mouth 2 times a day as needed for Anxiety for up to 30 days.  Dispense: 50 Tablet; Refill: 0    Obtained and reviewed patient utilization report from [facility] pharmacy database on 3/10/2023 at 1143 prior to writing prescription for controlled substance II, III or IV per [place] bill . Based on assessment of the report, the prescription is medically necessary.        Health Maintenance: Completed    Return in about 3 months (around 6/10/2023) for Medication F/U.    Discussed that the patient carries some responsibility in management of their health care.    Please note that this dictation was created using voice recognition software. I have made every reasonable attempt to correct obvious errors, but I expect that there are errors of grammar and possibly content that I did not discover before finalizing the note.    CHRISTINA Rivero  [facility]

## 2023-05-23 ENCOUNTER — OFFICE VISIT (OUTPATIENT)
Dept: MEDICAL GROUP | Facility: PHYSICIAN GROUP | Age: 58
End: 2023-05-23
Payer: OTHER GOVERNMENT

## 2023-05-23 VITALS
TEMPERATURE: 97.9 F | HEART RATE: 70 BPM | DIASTOLIC BLOOD PRESSURE: 74 MMHG | RESPIRATION RATE: 16 BRPM | BODY MASS INDEX: 27.48 KG/M2 | HEIGHT: 66 IN | OXYGEN SATURATION: 98 % | SYSTOLIC BLOOD PRESSURE: 128 MMHG | WEIGHT: 171 LBS

## 2023-05-23 DIAGNOSIS — R82.90 FOUL SMELLING URINE: ICD-10-CM

## 2023-05-23 DIAGNOSIS — E78.49 OTHER HYPERLIPIDEMIA: ICD-10-CM

## 2023-05-23 DIAGNOSIS — I10 ESSENTIAL HYPERTENSION: ICD-10-CM

## 2023-05-23 DIAGNOSIS — F41.9 ANXIETY DISORDER, UNSPECIFIED TYPE: ICD-10-CM

## 2023-05-23 PROCEDURE — 3074F SYST BP LT 130 MM HG: CPT | Performed by: NURSE PRACTITIONER

## 2023-05-23 PROCEDURE — 3078F DIAST BP <80 MM HG: CPT | Performed by: NURSE PRACTITIONER

## 2023-05-23 PROCEDURE — 99214 OFFICE O/P EST MOD 30 MIN: CPT | Performed by: NURSE PRACTITIONER

## 2023-05-23 RX ORDER — ALPRAZOLAM 0.5 MG/1
0.5 TABLET ORAL 2 TIMES DAILY PRN
Qty: 50 TABLET | Refills: 0 | Status: SHIPPED | OUTPATIENT
Start: 2023-06-16 | End: 2023-07-16

## 2023-05-23 RX ORDER — ALPRAZOLAM 0.5 MG/1
0.5 TABLET ORAL 2 TIMES DAILY PRN
Qty: 50 TABLET | Refills: 0 | Status: SHIPPED | OUTPATIENT
Start: 2023-08-15 | End: 2023-09-14

## 2023-05-23 RX ORDER — ROSUVASTATIN CALCIUM 10 MG/1
TABLET, COATED ORAL
Qty: 90 TABLET | Refills: 3 | Status: SHIPPED | OUTPATIENT
Start: 2023-05-23 | End: 2024-02-22 | Stop reason: SDUPTHER

## 2023-05-23 RX ORDER — ALPRAZOLAM 0.5 MG/1
0.5 TABLET ORAL 2 TIMES DAILY PRN
Qty: 50 TABLET | Refills: 0 | Status: SHIPPED | OUTPATIENT
Start: 2023-07-16 | End: 2023-08-09 | Stop reason: SDUPTHER

## 2023-05-23 RX ORDER — PROPRANOLOL HYDROCHLORIDE 10 MG/1
10 TABLET ORAL 2 TIMES DAILY
Qty: 180 TABLET | Refills: 1 | Status: SHIPPED | OUTPATIENT
Start: 2023-05-23 | End: 2024-02-22

## 2023-05-23 NOTE — PROGRESS NOTES
Subjective  Chief Complaint  Medication Refill    History of Present Illness  Christelle Kennedy is a 58 y.o. female. This established patient is here today to refill her anxiety medication.    Anxiety disorder, unspecified  Chronic and ongoing. Currently taking Xanax 0.5 mg up to twice a day as needed. She states that the medication does work well for her anxiety. Denies any side effects. Requesting refills today.    Past Medical History    Allergies: Latex and Tetracycline  Past Medical History:   Diagnosis Date    Allergy     Anxiety     Arthritis     Cancer (HCC)     Cervical    Depression     Headache(784.0)     Hyperlipidemia     Hypertension     Migraine     Migraine without aura, without mention of intractable migraine without mention of status migrainosus     Psoriasis     Urinary tract infection, site not specified      Past Surgical History:   Procedure Laterality Date    CHOLECYSTECTOMY  2001    RHINOPLASTY  1999    ABDOMINAL SUBTOTAL HYSTERECTOMY  1996    dysplasia    TONSILLECTOMY       Current Outpatient Medications Ordered in Epic   Medication Sig Dispense Refill    [START ON 6/16/2023] ALPRAZolam (XANAX) 0.5 MG Tab Take 1 Tablet by mouth 2 times a day as needed for Anxiety for up to 30 days. 50 Tablet 0    [START ON 7/16/2023] ALPRAZolam (XANAX) 0.5 MG Tab Take 1 Tablet by mouth 2 times a day as needed for Anxiety for up to 30 days. 50 Tablet 0    [START ON 8/15/2023] ALPRAZolam (XANAX) 0.5 MG Tab Take 1 Tablet by mouth 2 times a day as needed for Anxiety for up to 30 days. 50 Tablet 0    rosuvastatin (CRESTOR) 10 MG Tab TAKE 1 TABLET EVERY EVENING 90 Tablet 3    propranolol (INDERAL) 10 MG Tab Take 1 Tablet by mouth 2 times a day. 180 Tablet 1    DULoxetine (CYMBALTA) 30 MG Cap DR Particles       lidocaine (XYLOCAINE) 2 % Solution       omeprazole (PRILOSEC) 20 MG delayed-release capsule       estradiol (ESTRACE) 2 MG Tab TAKE 1 TABLET DAILY 90 Tablet 3    acyclovir (ZOVIRAX) 800 MG Tab Take 1  "Tablet by mouth 3 times a day. 270 Tablet 2    ibuprofen (MOTRIN) 800 MG Tab       cyclobenzaprine (FLEXERIL) 10 mg Tab TAKE 1 TABLET BY MOUTH EVERY DAY AS NEEDED FOR FLARES      Buprenorphine HCl (BELBUCA) 600 MCG FILM Place  into mouth between cheek and gum.      oxycodone immediate release (ROXICODONE) 10 MG immediate release tablet Take 1 Tab by mouth every 6 hours as needed for Moderate Pain. 120 Tab 0     No current Epic-ordered facility-administered medications on file.     Family History:    Family History   Problem Relation Age of Onset    Diabetes Mother     Hypertension Mother     Cancer Mother 62        breast    Hyperlipidemia Mother     Heart Disease Father     Stroke Paternal Grandmother 82    Hypertension Sister     Thyroid Sister     Lung Disease Neg Hx     Alcohol/Drug Neg Hx       Personal/Social History:    Social History     Tobacco Use    Smoking status: Every Day     Packs/day: 0.50     Years: 40.00     Pack years: 20.00     Types: Cigarettes    Smokeless tobacco: Never    Tobacco comments:     encouraged to quit, tried quitting   Vaping Use    Vaping Use: Never used   Substance Use Topics    Alcohol use: No     Alcohol/week: 0.0 oz     Comment: occasion 4 x a yr    Drug use: No     Comment: tired mj in past.     Social History     Social History Narrative    Not on file      Review of Systems:   General: Negative for fever/chills and unexpected weight change.   Respiratory:  Negative for cough and dyspnea.    Cardiovascular:  Negative for chest pain and palpitations.  Musculoskeletal:  Negative for myalgias.   Skin:  Negative for rash.     Objective  Physical Exam:   /74 (BP Location: Left arm, Patient Position: Sitting, BP Cuff Size: Adult)   Pulse 70   Temp 36.6 °C (97.9 °F) (Temporal)   Resp 16   Ht 1.676 m (5' 6\")   Wt 77.6 kg (171 lb)   SpO2 98%  Body mass index is 27.6 kg/m².  General:  Alert and oriented.  Well appearing.  NAD  Neck: Supple without JVD. No " lymphadenopathy.  Skin:  Warm and dry.  No obvious lesions.  Musculoskeletal:  No extremity cyanosis, clubbing, or edema.      Assessment/Plan  1. Anxiety disorder, unspecified type  Chronic and ongoing.  Continue to take Alprazolam 0.5 mg as needed up to twice a day.  - ALPRAZolam (XANAX) 0.5 MG Tab; Take 1 Tablet by mouth 2 times a day as needed for Anxiety for up to 30 days.  Dispense: 50 Tablet; Refill: 0  - ALPRAZolam (XANAX) 0.5 MG Tab; Take 1 Tablet by mouth 2 times a day as needed for Anxiety for up to 30 days.  Dispense: 50 Tablet; Refill: 0  - ALPRAZolam (XANAX) 0.5 MG Tab; Take 1 Tablet by mouth 2 times a day as needed for Anxiety for up to 30 days.  Dispense: 50 Tablet; Refill: 0    Obtained and reviewed patient utilization report from [facility] pharmacy database on 5/23/2023 at 1104 prior to writing prescription for controlled substance II, III or IV per [place] bill . Based on assessment of the report, the prescription is medically necessary.    2. Other hyperlipidemia  Chronic and ongoing.  Educated on a healthy diet and exercise.  Continue to take Rosuvastatin 10 mg every evening.  - rosuvastatin (CRESTOR) 10 MG Tab; TAKE 1 TABLET EVERY EVENING  Dispense: 90 Tablet; Refill: 3    3. Essential hypertension  Chronic and ongoing.  Continue to take Propranolol 10 mg BID.  Educated on a healthy diet and exercise.  - propranolol (INDERAL) 10 MG Tab; Take 1 Tablet by mouth 2 times a day.  Dispense: 180 Tablet; Refill: 1    4. Foul smelling urine  Acute and ongoing.  Discussed getting a UA, she states that at this time she can not give a sample and would like a lab order.  - URINALYSIS,CULTURE IF INDICATED; Future      Health Maintenance: Completed    Return in about 3 months (around 8/23/2023) for Medication F/U.    Discussed that the patient carries some responsibility in management of their health care.    Please note that this dictation was created using voice recognition software. I have made  every reasonable attempt to correct obvious errors, but I expect that there are errors of grammar and possibly content that I did not discover before finalizing the note.    CHRISTINA Rivero  [facility]

## 2023-06-07 LAB
BACTERIA UR CULT: NORMAL
BACTERIA UR CULT: NORMAL

## 2023-08-09 ENCOUNTER — OFFICE VISIT (OUTPATIENT)
Dept: MEDICAL GROUP | Facility: MEDICAL CENTER | Age: 58
End: 2023-08-09
Payer: OTHER GOVERNMENT

## 2023-08-09 VITALS
HEIGHT: 66 IN | WEIGHT: 168.8 LBS | BODY MASS INDEX: 27.13 KG/M2 | DIASTOLIC BLOOD PRESSURE: 82 MMHG | OXYGEN SATURATION: 96 % | RESPIRATION RATE: 16 BRPM | TEMPERATURE: 98.2 F | SYSTOLIC BLOOD PRESSURE: 138 MMHG | HEART RATE: 72 BPM

## 2023-08-09 DIAGNOSIS — E78.5 DYSLIPIDEMIA: ICD-10-CM

## 2023-08-09 DIAGNOSIS — Z91.89 AT HIGH RISK FOR BREAST CANCER: ICD-10-CM

## 2023-08-09 DIAGNOSIS — Z79.890 HORMONE REPLACEMENT THERAPY (POSTMENOPAUSAL): ICD-10-CM

## 2023-08-09 DIAGNOSIS — Z71.84 TRAVEL ADVICE ENCOUNTER: ICD-10-CM

## 2023-08-09 DIAGNOSIS — I10 ESSENTIAL HYPERTENSION: ICD-10-CM

## 2023-08-09 DIAGNOSIS — Z79.899 LONG TERM PRESCRIPTION BENZODIAZEPINE USE: ICD-10-CM

## 2023-08-09 DIAGNOSIS — G89.4 CHRONIC PAIN SYNDROME: ICD-10-CM

## 2023-08-09 DIAGNOSIS — M25.50 ARTHRALGIA, UNSPECIFIED JOINT: ICD-10-CM

## 2023-08-09 DIAGNOSIS — F41.9 ANXIETY DISORDER, UNSPECIFIED TYPE: ICD-10-CM

## 2023-08-09 PROCEDURE — 3075F SYST BP GE 130 - 139MM HG: CPT | Performed by: STUDENT IN AN ORGANIZED HEALTH CARE EDUCATION/TRAINING PROGRAM

## 2023-08-09 PROCEDURE — 3079F DIAST BP 80-89 MM HG: CPT | Performed by: STUDENT IN AN ORGANIZED HEALTH CARE EDUCATION/TRAINING PROGRAM

## 2023-08-09 PROCEDURE — 99214 OFFICE O/P EST MOD 30 MIN: CPT | Performed by: STUDENT IN AN ORGANIZED HEALTH CARE EDUCATION/TRAINING PROGRAM

## 2023-08-09 RX ORDER — ALPRAZOLAM 0.5 MG/1
0.5 TABLET ORAL 2 TIMES DAILY PRN
Qty: 50 TABLET | Refills: 2 | Status: SHIPPED | OUTPATIENT
Start: 2023-08-17 | End: 2023-11-01 | Stop reason: SDUPTHER

## 2023-08-09 RX ORDER — AZITHROMYCIN 500 MG/1
TABLET, FILM COATED ORAL
Qty: 9 TABLET | Refills: 0 | Status: SHIPPED
Start: 2023-08-09 | End: 2023-08-09

## 2023-08-09 ASSESSMENT — ENCOUNTER SYMPTOMS
CHILLS: 0
PALPITATIONS: 0
FEVER: 0
SHORTNESS OF BREATH: 0
WHEEZING: 0

## 2023-08-09 NOTE — PROGRESS NOTES
Subjective:     CC: N2U, xanax refill     HPI:   Christelle presents today with    58-year-old female with chronic pain syndrome following with pain management. Present for refill xanax 0.5mg BID for anxiety.   On duloxetine 30 mg and on chronic opiate therapy on buprenorphine and oxy [place] on benzodiazepine prescription      Problem   At High Risk for Breast Cancer    Reports has annual mammogram and MRI at [facility]  Reports BRCA gene, no records available     Essential Hypertension    Report history of elevated BP was started on propranolol 10mg BID, now on propranolol 10mg nightly, with improvement of blood pressure.     Recommend patient monitor home BP and follow up for consideration of other meds.       Hormone Replacement Therapy (Postmenopausal)    Report has been on estradiol 2mg daily orally x 4 years.   Discussed risk of clots in setting of smoking  Increase breast cancer risk due   Allergic to adhesive/ cannot take estrogen patch  May consider tapering off at 5 year     Chronic Pain Syndrome    - Chronic pain since age 30s, muscle aches, report history of EBV, intermittent pain in joints, history of cluster migraine previously evaluated [place] in [place], on disability.  - take acyclovir 800mg TID x 7-10 days for 1-2 course during joint flares?  - currently following with [place] [facility]       Anxiety Disorder, Unspecified    This is a chronic condition, report has tried bio feeback etc. Currently on propranolol 10mg daily prn, alprazolam 0.5mg BID prn report on and off since 2000.     RAVEN-7:   PHQ:  Associated symptoms:  Denies  psychotic features, Denies personal history of PTSD or manic episodes, Denies significant family history of bipolar disorder  Suicidal ideation/homicidal ideation: Denies  Therapy/counseling: deferred     Long Term Prescription Benzodiazepine Use    Patient reports she has been on xanax 0.5mg BID PRN, on and off since 2000. Also since since menopause.      Dyslipidemia  "   On rosuvastatin 10mg      Joint Pain    Reports history of EBV flare/ joint flare for which she would take   Takes acyclovir 800mg TID for 7-10 days          Health Maintenance:     ROS:  Review of Systems   Constitutional:  Negative for chills and fever.   Respiratory:  Negative for shortness of breath and wheezing.    Cardiovascular:  Negative for chest pain and palpitations.       Objective:     Exam:  /82   Pulse 72   Temp 36.8 °C (98.2 °F) (Temporal)   Resp 16   Ht 1.676 m (5' 6\")   Wt 76.6 kg (168 lb 12.8 oz)   SpO2 96%   BMI 27.25 kg/m²  Body mass index is 27.25 kg/m².    Physical Exam  Constitutional:       Appearance: Normal appearance.   Cardiovascular:      Rate and Rhythm: Normal rate and regular rhythm.   Pulmonary:      Effort: Pulmonary effort is normal.      Breath sounds: Normal breath sounds.   Neurological:      Mental Status: She is alert.   Psychiatric:         Mood and Affect: Mood normal.         Behavior: Behavior normal.         Thought Content: Thought content normal.         Judgment: Judgment normal.             Labs:     Assessment & Plan:     58 y.o. female with the following -   1. Anxiety disorder, unspecified type  Chronic, stable  See hpi  Has been on xanax 0.5mg BID reported on and off since 2000.   Denies a/e taking benzo with opiate.  Understand risk of over sedation includes death  - PAIN MANAGEMENT SCRN, UR; Future  - Controlled Substance Treatment Agreement  - ALPRAZolam (XANAX) 0.5 MG Tab; Take 1 Tablet by mouth 2 times a day as needed for Anxiety for up to 90 days.  Dispense: 50 Tablet; Refill: 2    2. Long term prescription benzodiazepine use  See hpi  - PAIN MANAGEMENT SCRN, UR; Future  - Controlled Substance Treatment Agreement  - ALPRAZolam (XANAX) 0.5 MG Tab; Take 1 Tablet by mouth 2 times a day as needed for Anxiety for up to 90 days.  Dispense: 50 Tablet; Refill: 2    3. Essential hypertension  Chronic, stable  Prescribed propranolol 10mg taking " nightly  Recommend monitoring home BP start/ adjust medication/ dose as needed    4. Dyslipidemia  On rosuvastatin    5. Chronic pain syndrome  See hpi    6. Hormone replacement therapy (postmenopausal)  Chronic stable  On estrodiol  Discussion as per HPI regarding risk of medication    7. Arthralgia, unspecified joint  Chronic, nonspecific joint flair pain follow with pain management and take acyclovir intermittently for possible EBV flares    8. At high risk for breast cancer  See hpi            Return in about 3 months (around 11/9/2023) for Controlled Substance management.    Please note that this dictation was created using voice recognition software. I have made every reasonable attempt to correct obvious errors, but I expect that there are errors of grammar and possibly content that I did not discover before finalizing the note.

## 2023-10-10 DIAGNOSIS — N95.1 MENOPAUSAL SYMPTOMS: ICD-10-CM

## 2023-10-10 RX ORDER — ESTRADIOL 2 MG/1
2 TABLET ORAL DAILY
Qty: 90 TABLET | Refills: 3 | Status: SHIPPED | OUTPATIENT
Start: 2023-10-10 | End: 2023-10-20 | Stop reason: SDUPTHER

## 2023-10-20 DIAGNOSIS — N95.1 MENOPAUSAL SYMPTOMS: ICD-10-CM

## 2023-10-20 RX ORDER — ESTRADIOL 2 MG/1
2 TABLET ORAL DAILY
Qty: 90 TABLET | Refills: 3 | Status: SHIPPED | OUTPATIENT
Start: 2023-10-20 | End: 2024-02-22 | Stop reason: SDUPTHER

## 2023-11-01 ENCOUNTER — OFFICE VISIT (OUTPATIENT)
Dept: MEDICAL GROUP | Facility: PHYSICIAN GROUP | Age: 58
End: 2023-11-01
Payer: OTHER GOVERNMENT

## 2023-11-01 VITALS
TEMPERATURE: 98.2 F | DIASTOLIC BLOOD PRESSURE: 82 MMHG | OXYGEN SATURATION: 96 % | SYSTOLIC BLOOD PRESSURE: 176 MMHG | HEIGHT: 66 IN | BODY MASS INDEX: 25.47 KG/M2 | RESPIRATION RATE: 16 BRPM | HEART RATE: 68 BPM | WEIGHT: 158.5 LBS

## 2023-11-01 DIAGNOSIS — Z13.79 GENETIC SCREENING: ICD-10-CM

## 2023-11-01 DIAGNOSIS — E55.9 VITAMIN D DEFICIENCY: ICD-10-CM

## 2023-11-01 DIAGNOSIS — Z91.89 AT HIGH RISK FOR BREAST CANCER: ICD-10-CM

## 2023-11-01 DIAGNOSIS — Z79.899 CHRONIC USE OF BENZODIAZEPINE FOR THERAPEUTIC PURPOSE: ICD-10-CM

## 2023-11-01 DIAGNOSIS — L40.9 PSORIASIS: ICD-10-CM

## 2023-11-01 DIAGNOSIS — G89.4 CHRONIC PAIN SYNDROME: ICD-10-CM

## 2023-11-01 DIAGNOSIS — Z79.899 LONG TERM PRESCRIPTION BENZODIAZEPINE USE: ICD-10-CM

## 2023-11-01 DIAGNOSIS — F17.210 CIGARETTE SMOKER: ICD-10-CM

## 2023-11-01 DIAGNOSIS — M79.2 NEUROPATHIC PAIN OF HAND, UNSPECIFIED LATERALITY: ICD-10-CM

## 2023-11-01 DIAGNOSIS — F11.20 OPIATE DEPENDENCE, CONTINUOUS (HCC): ICD-10-CM

## 2023-11-01 DIAGNOSIS — I10 ESSENTIAL HYPERTENSION: ICD-10-CM

## 2023-11-01 DIAGNOSIS — K14.6 BURNING MOUTH SYNDROME: ICD-10-CM

## 2023-11-01 DIAGNOSIS — G62.9 NEUROPATHY: ICD-10-CM

## 2023-11-01 DIAGNOSIS — G47.09 OTHER INSOMNIA: ICD-10-CM

## 2023-11-01 DIAGNOSIS — R53.83 OTHER FATIGUE: ICD-10-CM

## 2023-11-01 DIAGNOSIS — F41.9 ANXIETY DISORDER, UNSPECIFIED TYPE: ICD-10-CM

## 2023-11-01 DIAGNOSIS — Z11.3 SCREENING EXAMINATION FOR SEXUALLY TRANSMITTED DISEASE: ICD-10-CM

## 2023-11-01 DIAGNOSIS — M25.50 ARTHRALGIA, UNSPECIFIED JOINT: ICD-10-CM

## 2023-11-01 DIAGNOSIS — Z79.890 HORMONE REPLACEMENT THERAPY (POSTMENOPAUSAL): ICD-10-CM

## 2023-11-01 DIAGNOSIS — E78.5 DYSLIPIDEMIA: ICD-10-CM

## 2023-11-01 PROBLEM — Z12.83 SKIN CANCER SCREENING: Status: RESOLVED | Noted: 2021-06-22 | Resolved: 2023-11-01

## 2023-11-01 PROCEDURE — 99214 OFFICE O/P EST MOD 30 MIN: CPT | Performed by: PHYSICIAN ASSISTANT

## 2023-11-01 PROCEDURE — 3079F DIAST BP 80-89 MM HG: CPT | Performed by: PHYSICIAN ASSISTANT

## 2023-11-01 PROCEDURE — 3077F SYST BP >= 140 MM HG: CPT | Performed by: PHYSICIAN ASSISTANT

## 2023-11-01 RX ORDER — OXCARBAZEPINE 300 MG/1
TABLET, FILM COATED ORAL
COMMUNITY
Start: 2023-10-20 | End: 2024-01-20 | Stop reason: SDUPTHER

## 2023-11-01 RX ORDER — ALPRAZOLAM 0.5 MG/1
0.5 TABLET ORAL 2 TIMES DAILY PRN
Qty: 45 TABLET | Refills: 2 | Status: SHIPPED | OUTPATIENT
Start: 2023-11-01 | End: 2024-01-30

## 2023-11-01 RX ORDER — OXYCODONE HYDROCHLORIDE 20 MG/1
TABLET ORAL
COMMUNITY
Start: 2023-10-16 | End: 2023-11-01

## 2023-11-01 RX ORDER — ACETAMINOPHEN 500 MG
500-1000 TABLET ORAL EVERY 6 HOURS PRN
COMMUNITY

## 2023-11-01 RX ORDER — BUPRENORPHINE HYDROCHLORIDE 900 UG/1
FILM, SOLUBLE BUCCAL
COMMUNITY
Start: 2023-10-13 | End: 2023-11-01

## 2023-11-01 ASSESSMENT — ENCOUNTER SYMPTOMS
MYALGIAS: 1
FEVER: 0
SHORTNESS OF BREATH: 0
INSOMNIA: 1
NERVOUS/ANXIOUS: 1
CHILLS: 0

## 2023-11-01 NOTE — PROGRESS NOTES
Subjective:     CC: establish care; prior CHRISTINA Rivero     HPI:   Christelle presents today with the following:    Problem   Other Insomnia    Chronic, uncontrolled.  Uses alprazolam 0.5mg every night and sometimes during the day for anxiety, maybe twice/week.  Has been on alprazolam for years.       Cigarette Smoker    Chronic, uncontrolled.  Patient is not ready quit.  Started smoking when she was 13.  Smokes less than 1 pack/day.  Understands the risks of smoking with HRT.     Chronic Use of Benzodiazepine for Therapeutic Purpose   At High Risk for Breast Cancer    Reports has annual mammogram and MRI at [facility]  Reports BRCA gene, no records available.  Referring to [facility] [place] [employer].         Essential Hypertension    Chronic, uncontrolled.  Tolerating propranolol 10 mg once daily.  182/90 in clinic, which was a repeat blood pressure.    Patient to monitor at home and bring her blood pressure cuff at follow-up visit in about a month.     Burning Mouth Syndrome    Chronic, uncontrolled.  Dental has been unable to figure out cause.  Pain management tried an injection in her throat --> it helped the first time, seemed to hit the nerve, until the lidocaine wore off BUT the next 2 injections didn't do anything.  Pain management is working on this.  Trying oxcarbazepine 300mg BID to see if it helps.  It has helped some.    Tolerates viscous lidocaine as needed.  Needs a prescription sent to [facility] but will send me a [employer] message on which [facility] that she wanted to go to.     Hormone Replacement Therapy (Postmenopausal)    Chronic, uncontrolled.  Started estradiol due to night sweats and mood changes.  Started it around 2018.  Would like to wean.  She is going to try cutting her current dose down to 1mg or 1.5mg.  Understands risks of smoking with HRT.    Interval history:  Report has been on estradiol 2mg daily orally x 4 years.   Discussed risk of clots in setting of smoking  Increase  breast cancer risk due   Allergic to adhesive/ cannot take estrogen patch  May consider tapering off at 5 year     Chronic Pain Syndrome    Chronic, controlled.  Oxycodone 10mg q 6 hours.  Belbuca 600mg q 12 hours.  Was on oxy 10mg q 4 hours --> Belbuca has allowed her to come down on her oxycodone dosing.    cymbalta 30mg: pain and anxiety.    Interval history:  - Chronic pain since age 30s, muscle aches, report history of EBV, intermittent pain in joints, history of cluster migraine previously evaluated [place] in [place], on [place].  - take acyclovir 800mg TID x 7-10 days for 1-2 course during joint flares.  - currently following with [facility]       Anxiety Disorder, Unspecified    Chronic, controlled.  Tolerating cymbalta 30mg daily for maybe a year or so.  Feels better after starting oxcarbazepine (for the burning in her mouth).    Uses alprazolam 0.5mg every night and sometimes during the day for anxiety, maybe twice/week.     Opiate dependence, continuous (HCC)    Chronic, stable.  Chronic pain is managed by [facility].  Oxycodone 10 mg every 6 hours and Belbuca 600 mg twice daily.           Psoriasis    Chronic, stable.  Tolerating fluocinonide cream prn.     Vitamin D Deficiency    Chronic, stable.  Currently taking vitamin D supplementation.  She is unsure of the dose.     Neuropathic Pain of Hand    managed by [facility].     Neuropathy    managed by [facility].     Dyslipidemia    Chronic, uncontrolled.     Latest Labs:   Lab Results   Component Value Date/Time    CHOLSTRLTOT 184 08/08/2022 09:08 AM    CHOLSTRLTOT 233 (H) 07/29/2021 05:25 PM     (H) 07/29/2021 05:25 PM    HDL 82 08/08/2022 09:08 AM    HDL 68 07/29/2021 05:25 PM    TRIGLYCERIDE 90 08/08/2022 09:08 AM    TRIGLYCERIDE 136 07/29/2021 05:25 PM      Medications: rosuvastatin 10 --> may need to increase dose; due for labs.    Risk calculator: The 10-year ASCVD risk score (Jozef HERNANDEZ, et al.,  "2019) is: 11.1%        Joint Pain    Chronic, intermittent.    Interval history:  Reports history of EBV flare/ joint flare for which she would take   Takes acyclovir 800mg TID for 7-10 days      Skin Cancer Screening (Resolved)   Sleep Disorder (Resolved)   Long Term Prescription Benzodiazepine Use (Resolved)    Patient reports she has been on xanax 0.5mg BID PRN, on and off since 2000. Also since since menopause.            ROS:  Review of Systems   Constitutional:  Negative for chills and fever.   Respiratory:  Negative for shortness of breath.    Cardiovascular:  Negative for chest pain.   Musculoskeletal:  Positive for myalgias.   Psychiatric/Behavioral:  The patient is nervous/anxious and has insomnia.        Objective:     Exam:  BP (!) 176/82 (BP Location: Right arm, Patient Position: Sitting, BP Cuff Size: Adult)   Pulse 68   Temp 36.8 °C (98.2 °F) (Temporal)   Resp 16   Ht 1.676 m (5' 6\")   Wt 71.9 kg (158 lb 8 oz)   SpO2 96%   Breastfeeding No   BMI 25.58 kg/m²  Body mass index is 25.58 kg/m².    Repeat blood pressure left upper extremity: 182/90  Physical Exam  Vitals reviewed.   Constitutional:       General: She is not in acute distress.     Appearance: Normal appearance.   Cardiovascular:      Rate and Rhythm: Normal rate and regular rhythm.      Heart sounds: No murmur heard.  Pulmonary:      Effort: Pulmonary effort is normal.      Breath sounds: No wheezing.   Neurological:      General: No focal deficit present.      Mental Status: She is alert.   Psychiatric:         Mood and Affect: Mood normal.         Behavior: Behavior normal.         Judgment: Judgment normal.             Assessment & Plan:     58 y.o. female with the following -     1. Essential hypertension  Chronic, uncontrolled.  Continue propranolol 10 mg daily.  Continue monitoring at home and follow-up in 1 month for reevaluation.    - Comp Metabolic Panel; Future    2. Dyslipidemia  Chronic, uncontrolled.  Due to repeat " labs.  Continue rosuvastatin 10 mg.    - Lipid Profile; Future    3. Other insomnia  Chronic, stable.  Patient has been on alprazolam for 20+ years.  She takes 0.5 mg nightly which is the only thing that seems to help her sleep.  PDMP review shows no signs of medication abuse or misuse.  PDMP review is consistent with patient's reported history.    - ALPRAZolam (XANAX) 0.5 MG Tab; Take 1 Tablet by mouth 2 times a day as needed for Anxiety for up to 90 days. EACH PRESCRIPTION IS 30 DAYS  Dispense: 45 Tablet; Refill: 2    4. Anxiety disorder, unspecified type  Chronic, stable.  Patient uses alprazolam 0.5 mg around twice weekly for anxiety flares.  She has been on this medication for 20+ years.  She has also been on this along with opioids and buprenorphine without issue.  She was recently decreased on her oxycodone from 10 mg every 4 hours to 10 mg every 6 hours.    - ALPRAZolam (XANAX) 0.5 MG Tab; Take 1 Tablet by mouth 2 times a day as needed for Anxiety for up to 90 days. EACH PRESCRIPTION IS 30 DAYS  Dispense: 45 Tablet; Refill: 2    5. Long term prescription benzodiazepine use  PDMP review shows no signs of medication abuse or misuse.  PDMP review is consistent with patient's reported history.    UDS 8/9/2023 consistent with use    - ALPRAZolam (XANAX) 0.5 MG Tab; Take 1 Tablet by mouth 2 times a day as needed for Anxiety for up to 90 days. EACH PRESCRIPTION IS 30 DAYS  Dispense: 45 Tablet; Refill: 2    6. Opiate dependence, continuous (HCC)  Chronic, stable.  Patient is managed by [facility].  She was previously on oxycodone 10 mg every 4 hours and is now every 6 hours.  She is also on buprenorphine twice daily.  She has been on opioids with benzodiazepines for many years without incident.  Reiterated concern of taking both medications together.    UDS 8/9/2023 consistent with use    7. At high risk for breast cancer  Chronic, control unknown.  Referring to [facility] [place] [facility].  Breast MRI ordered  per radiology recommendation.    - Referral to Genetic Research Studies  - MR-BREAST-BILATERAL-WITH & W/O; Future    8. Hormone replacement therapy (postmenopausal)  Chronic, uncontrolled.  Patient is on 2 mg of estradiol daily.  She is decreasing to 1.5 mg daily with the hopes of weaning completely.  Patient is a current cigarette smoker and is at high risk for breast cancer.  She is aware of the potential increased risk with HRT.    9. Cigarette smoker  Chronic, uncontrolled.  Patient is not ready to quit.  Referring to the lung cancer screening program.    - REFERRAL TO LUNG CANCER SCREENING PROGRAM    10. Vitamin D deficiency  Chronic, control unknown.  Due to repeat labs.    - VITAMIN D,25 HYDROXY (DEFICIENCY); Future    11. Burning mouth syndrome  Chronic, uncontrolled.  Patient is working with her pain management doctor to resolve the issue.  Patient would like some viscous lidocaine but will let me know what pharmacy she wants me to send it to.    12. Chronic pain syndrome  See #6.    13. Other fatigue  Chronic, intermittent.  Checking labs.    - CBC WITH DIFFERENTIAL; Future  - TSH WITH REFLEX TO FT4; Future    14. Screening examination for sexually transmitted disease    - HIV AG/AB COMBO ASSAY SCREENING; Future    15. Genetic screening    - Referral to Genetic Research Studies    16. Psoriasis  Chronic, stable.  Continue steroid cream as directed.  - fluocinonide (LIDEX) 0.05 % Cream; Apply to affected area up to twice daily.  Dispense: 120 g; Refill: 1    17. Neuropathy  Managed by [place] [facility].    18. Neuropathic pain of hand, unspecified laterality  Managed by [place] [facility].    19. Arthralgia, unspecified joint  Managed by [place] [facility].        PDMP REVIEW:    Healthcare Maintenance:        Return in about 4 weeks (around 11/29/2023), or if symptoms worsen or fail to improve, for blood pressure, lab discussion.    Please note that this dictation was created using voice  recognition software. I have made every reasonable attempt to correct obvious errors, but I expect that there are errors of grammar and possibly content that I did not discover before finalizing the note.

## 2023-11-18 LAB
25(OH)D3+25(OH)D2 SERPL-MCNC: 70.5 NG/ML (ref 30–100)
ALBUMIN SERPL-MCNC: 4.7 G/DL (ref 3.8–4.9)
ALBUMIN/GLOB SERPL: 2.5 {RATIO} (ref 1.2–2.2)
ALP SERPL-CCNC: 54 IU/L (ref 44–121)
ALT SERPL-CCNC: 28 IU/L (ref 0–32)
AST SERPL-CCNC: 24 IU/L (ref 0–40)
BASOPHILS # BLD AUTO: 0.1 X10E3/UL (ref 0–0.2)
BASOPHILS NFR BLD AUTO: 1 %
BILIRUB SERPL-MCNC: 0.4 MG/DL (ref 0–1.2)
BUN SERPL-MCNC: 13 MG/DL (ref 6–24)
BUN/CREAT SERPL: 19 (ref 9–23)
CALCIUM SERPL-MCNC: 9.7 MG/DL (ref 8.7–10.2)
CHLORIDE SERPL-SCNC: 100 MMOL/L (ref 96–106)
CHOLEST SERPL-MCNC: 159 MG/DL (ref 100–199)
CO2 SERPL-SCNC: 27 MMOL/L (ref 20–29)
CREAT SERPL-MCNC: 0.68 MG/DL (ref 0.57–1)
EGFRCR SERPLBLD CKD-EPI 2021: 100 ML/MIN/1.73
EOSINOPHIL # BLD AUTO: 0.1 X10E3/UL (ref 0–0.4)
EOSINOPHIL NFR BLD AUTO: 2 %
ERYTHROCYTE [DISTWIDTH] IN BLOOD BY AUTOMATED COUNT: 12.3 % (ref 11.7–15.4)
GLOBULIN SER CALC-MCNC: 1.9 G/DL (ref 1.5–4.5)
GLUCOSE SERPL-MCNC: 92 MG/DL (ref 70–99)
HCT VFR BLD AUTO: 48.6 % (ref 34–46.6)
HDLC SERPL-MCNC: 64 MG/DL
HGB BLD-MCNC: 16.5 G/DL (ref 11.1–15.9)
HIV 1+2 AB+HIV1 P24 AG SERPL QL IA: NON REACTIVE
IMM GRANULOCYTES # BLD AUTO: 0 X10E3/UL (ref 0–0.1)
IMM GRANULOCYTES NFR BLD AUTO: 0 %
IMMATURE CELLS  115398: ABNORMAL
LABORATORY COMMENT REPORT: NORMAL
LDLC SERPL CALC-MCNC: 78 MG/DL (ref 0–99)
LYMPHOCYTES # BLD AUTO: 2.2 X10E3/UL (ref 0.7–3.1)
LYMPHOCYTES NFR BLD AUTO: 30 %
MCH RBC QN AUTO: 33.7 PG (ref 26.6–33)
MCHC RBC AUTO-ENTMCNC: 34 G/DL (ref 31.5–35.7)
MCV RBC AUTO: 99 FL (ref 79–97)
MONOCYTES # BLD AUTO: 0.4 X10E3/UL (ref 0.1–0.9)
MONOCYTES NFR BLD AUTO: 6 %
MORPHOLOGY BLD-IMP: ABNORMAL
NEUTROPHILS # BLD AUTO: 4.7 X10E3/UL (ref 1.4–7)
NEUTROPHILS NFR BLD AUTO: 61 %
NRBC BLD AUTO-RTO: ABNORMAL %
PLATELET # BLD AUTO: 325 X10E3/UL (ref 150–450)
POTASSIUM SERPL-SCNC: 4.8 MMOL/L (ref 3.5–5.2)
PROT SERPL-MCNC: 6.6 G/DL (ref 6–8.5)
RBC # BLD AUTO: 4.9 X10E6/UL (ref 3.77–5.28)
SODIUM SERPL-SCNC: 139 MMOL/L (ref 134–144)
TRIGL SERPL-MCNC: 90 MG/DL (ref 0–149)
TSH SERPL DL<=0.005 MIU/L-ACNC: 0.92 UIU/ML (ref 0.45–4.5)
VLDLC SERPL CALC-MCNC: 17 MG/DL (ref 5–40)
WBC # BLD AUTO: 7.6 X10E3/UL (ref 3.4–10.8)

## 2023-11-27 ENCOUNTER — TELEMEDICINE (OUTPATIENT)
Dept: MEDICAL GROUP | Facility: PHYSICIAN GROUP | Age: 58
End: 2023-11-27
Payer: OTHER GOVERNMENT

## 2023-11-27 VITALS — HEIGHT: 66 IN | WEIGHT: 158.5 LBS | RESPIRATION RATE: 16 BRPM | BODY MASS INDEX: 25.47 KG/M2

## 2023-11-27 DIAGNOSIS — Z79.890 HORMONE REPLACEMENT THERAPY (POSTMENOPAUSAL): ICD-10-CM

## 2023-11-27 DIAGNOSIS — G47.39 OTHER SLEEP APNEA: ICD-10-CM

## 2023-11-27 DIAGNOSIS — F17.210 CIGARETTE SMOKER: ICD-10-CM

## 2023-11-27 DIAGNOSIS — Z79.899 CHRONIC USE OF BENZODIAZEPINE FOR THERAPEUTIC PURPOSE: ICD-10-CM

## 2023-11-27 DIAGNOSIS — F41.9 ANXIETY DISORDER, UNSPECIFIED TYPE: ICD-10-CM

## 2023-11-27 DIAGNOSIS — D75.1 POLYCYTHEMIA: ICD-10-CM

## 2023-11-27 PROCEDURE — 99214 OFFICE O/P EST MOD 30 MIN: CPT | Mod: 95 | Performed by: PHYSICIAN ASSISTANT

## 2023-11-27 ASSESSMENT — ENCOUNTER SYMPTOMS
SHORTNESS OF BREATH: 0
CHILLS: 0
FEVER: 0

## 2023-11-27 ASSESSMENT — FIBROSIS 4 INDEX: FIB4 SCORE: 0.82

## 2023-11-27 NOTE — PROGRESS NOTES
Virtual Visit: Established Patient   This visit was conducted via Zoom using secure and encrypted videoconferencing technology.   The patient was in their home in the [facility].    The patient's identity was confirmed and verbal consent was obtained for this virtual visit.     Subjective:   CC:   Chief Complaint   Patient presents with    Lab Results    Follow-Up     Hormone therapy       Christelle Kennedy is a 58 y.o. female presenting for evaluation and management of:    Problem   Polycythemia    Chronic, uncontrolled.  Likely secondary to cigarette smoking.    Also has known sleep apnea that is untreated.  Patient politely declines any further work-up for sleep apnea as she does not want to wear CPAP and is unable to tolerate the nightguard.    Component      Latest Ref Rng 11/17/2023   Hemoglobin      11.1 - 15.9 g/dL 16.5 (H)    Hematocrit      34.0 - 46.6 % 48.6 (H)    MCV      79 - 97 fL 99 (H)    MCH      26.6 - 33.0 pg 33.7 (H)       Other Sleep Apnea    Chronic, uncontrolled.  Patient was using a mouthpiece but has not continued it as it is too tight in her mouth.  She declines further evaluation as she has no interest in wearing a CPAP.     Cigarette Smoker    Chronic, uncontrolled.  Patient is not ready quit.  Started smoking when she was 13.  Smokes less than 1 pack/day.  Understands the risks of smoking with HRT.       Chronic Use of Benzodiazepine for Therapeutic Purpose    UDS 8/9/2023 consistent with use       Hormone Replacement Therapy (Postmenopausal)    Chronic, uncontrolled.  Started estradiol due to night sweats and mood changes.    Started it around 2018.  Would like to wean.  She is going to try cutting her current dose down to 1mg or 1.5mg.  Understands risks of smoking with HRT.    Interval history:  Report has been on estradiol 2mg daily orally x 4 years.   Discussed risk of clots in setting of smoking  Increase breast cancer risk due   Allergic to adhesive/ cannot take estrogen  patch  May consider tapering off at 5 year    11/27/2023:  Chronic, uncontrolled, improving.  Down to estradiol 1mg (x 2 weeks).  Having hot flashes again but not very often.  Maybe having hot flashes a couple times per week but not for long and not getting sweaty.  Will continue 1mg for another couple weeks then decrease to 0.5mg.  Will let me know how she is doing and at what dose.     Anxiety Disorder, Unspecified    Chronic, controlled.  Tolerating cymbalta 30mg daily for maybe a year or so.  Feels better after starting oxcarbazepine (for the burning in her mouth).    Uses alprazolam 0.5mg every night and sometimes during the day for anxiety, maybe twice/week.    Also on oxycodone 10mg, #120/month from [facility].  Understands risks of opioid therapy with benzodiazepine use.           ROS   Positive for chronic pain    Current medicines (including changes today)  Current Outpatient Medications   Medication Sig Dispense Refill    fluocinonide (LIDEX) 0.05 % Cream MAX USE 14 DAYS IN A ROW. Apply to affected area up to twice daily as needed for rash. 120 g 1    OXcarbazepine (TRILEPTAL) 300 MG Tab       acetaminophen (TYLENOL) 500 MG Tab Take 500-1,000 mg by mouth every 6 hours as needed.      ALPRAZolam (XANAX) 0.5 MG Tab Take 1 Tablet by mouth 2 times a day as needed for Anxiety for up to 90 days. EACH PRESCRIPTION IS 30 DAYS 45 Tablet 2    estradiol (ESTRACE) 2 MG Tab Take 1 Tablet by mouth every day. (Patient taking differently: Take 1 mg by mouth every day.) 90 Tablet 3    rosuvastatin (CRESTOR) 10 MG Tab TAKE 1 TABLET EVERY EVENING 90 Tablet 3    propranolol (INDERAL) 10 MG Tab Take 1 Tablet by mouth 2 times a day. (Patient taking differently: Take 10 mg by mouth 1 time a day as needed.) 180 Tablet 1    DULoxetine (CYMBALTA) 30 MG Cap DR Particles       acyclovir (ZOVIRAX) 800 MG Tab Take 1 Tablet by mouth 3 times a day. 270 Tablet 2    Buprenorphine HCl (BELBUCA) 600 MCG FILM Place 900 mcg into  "mouth between cheek and gum.      oxycodone immediate release (ROXICODONE) 10 MG immediate release tablet Take 1 Tab by mouth every 6 hours as needed for Moderate Pain. 120 Tab 0     No current facility-administered medications for this visit.       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    Hormone replacement therapy (postmenopausal) 04/07/2022    Chronic pain syndrome 08/05/2016    Anxiety disorder, unspecified 08/05/2016    Opiate dependence, continuous (HCC) 02/16/2016    Long term prescription opiate use 02/16/2016    Migraine without aura     Psoriasis 06/12/2014    Vitamin D deficiency 12/18/2012    Neuropathic pain of hand 07/23/2012    Neuropathy 07/17/2012    Dyslipidemia 06/25/2011    Joint pain 06/25/2011        Objective:   Resp 16   Ht 1.676 m (5' 6\") Comment: per patient  Wt 71.9 kg (158 lb 8 oz) Comment: per patient  Breastfeeding No   BMI 25.58 kg/m²     Physical Exam:  Constitutional: Alert, no distress, well-groomed.  Skin: No rashes in visible areas.  Eye: Round. Conjunctiva clear, lids normal. No icterus.   ENMT: Lips pink without lesions, good dentition, moist mucous membranes. Phonation normal.  Neck: No masses, no thyromegaly. Moves freely without pain.  Respiratory: Unlabored respiratory effort, no cough or audible wheeze  Psych: Alert and oriented x3, normal affect and mood.         Assessment and Plan:   The following treatment plan was discussed:     1. Polycythemia  Chronic, uncontrolled.  Patient is a non-smoker and has untreated sleep apnea, for which she declines further work-up.  She has been unable to tolerate the mouthguard and is not interested in CPAP.  Checking " additional labs.    - FERRITIN; Future  - IRON/TOTAL IRON BIND; Future  - ERYTHROPOIETIN; Future  - JAK2 GENE MUT RFLX CALR RFLX MPL; Future    2. Cigarette smoker  Chronic, uncontrolled per  Not ready to quit.    3. Hormone replacement therapy (postmenopausal)  Chronic, stable.  Patient has cut her dose of estradiol from 2 mg down to 1 mg.  She is going to try to cut down to half a milligram if tolerated over the next month or so.  Patient is aware of the risks associated with HRT and cigarette smoking.    4. Anxiety disorder, unspecified type  Chronic, stable.  Continue duloxetine 30 mg daily.  Continues with alprazolam 0.5 mg up to twice daily as needed for anxiety.    5. Chronic use of benzodiazepine for therapeutic purpose    Scan 2/11/2016: colonoscopy, repeat pending pathology ([facility]).   No pathology scanned.  Patient has this letter and will upload it to [employer].    Follow-up: Return in about 3 months (around 2/27/2024) for alprazolam refill.

## 2023-11-27 NOTE — PROGRESS NOTES
Subjective:     CC:     HPI:   Christelle presents today with the following:    Problem   Polycythemia    Chronic, uncontrolled.  Likely secondary to cigarette smoking.    Also has known sleep apnea that is untreated.  Patient politely declines any further work-up for sleep apnea as she does not want to wear CPAP and is unable to tolerate the nightguard.    Component      Latest Ref Rng 11/17/2023   Hemoglobin      11.1 - 15.9 g/dL 16.5 (H)    Hematocrit      34.0 - 46.6 % 48.6 (H)    MCV      79 - 97 fL 99 (H)    MCH      26.6 - 33.0 pg 33.7 (H)       Other Sleep Apnea    Chronic, uncontrolled.  Patient was using a mouthpiece but has not continued it as it is too tight in her mouth.  She declines further evaluation as she has no interest in wearing a CPAP.     Cigarette Smoker    Chronic, uncontrolled.  Patient is not ready quit.  Started smoking when she was 13.  Smokes less than 1 pack/day.  Understands the risks of smoking with HRT.       Chronic Use of Benzodiazepine for Therapeutic Purpose    UDS 8/9/2023 consistent with use       Hormone Replacement Therapy (Postmenopausal)    Chronic, uncontrolled.  Started estradiol due to night sweats and mood changes.    Started it around 2018.  Would like to wean.  She is going to try cutting her current dose down to 1mg or 1.5mg.  Understands risks of smoking with HRT.    Interval history:  Report has been on estradiol 2mg daily orally x 4 years.   Discussed risk of clots in setting of smoking  Increase breast cancer risk due   Allergic to adhesive/ cannot take estrogen patch  May consider tapering off at 5 year    11/27/2023:  Chronic, uncontrolled, improving.  Down to estradiol 1mg (x 2 weeks).  Having hot flashes again but not very often.  Maybe having hot flashes a couple times per week but not for long and not getting sweaty.  Will continue 1mg for another couple weeks then decrease to 0.5mg.  Will let me know how she is doing and at what dose.     Anxiety Disorder,  "Unspecified    Chronic, controlled.  Tolerating cymbalta 30mg daily for maybe a year or so.  Feels better after starting oxcarbazepine (for the burning in her mouth).    Uses alprazolam 0.5mg every night and sometimes during the day for anxiety, maybe twice/week.    Also on oxycodone 10mg, #120/month from [facility].  Understands risks of opioid therapy with benzodiazepine use.           ROS:  Review of Systems   Constitutional:  Negative for chills and fever.   Respiratory:  Negative for shortness of breath.    Cardiovascular:  Negative for chest pain.       Objective:     Exam:  Resp 16   Ht 1.676 m (5' 6\") Comment: per patient  Wt 71.9 kg (158 lb 8 oz) Comment: per patient  Breastfeeding No   BMI 25.58 kg/m²  Body mass index is 25.58 kg/m².    Physical Exam  Vitals reviewed.   Constitutional:       General: She is not in acute distress.     Appearance: Normal appearance.   Pulmonary:      Effort: Pulmonary effort is normal.   Neurological:      General: No focal deficit present.      Mental Status: She is alert.   Psychiatric:         Mood and Affect: Mood normal.         Behavior: Behavior normal.         Judgment: Judgment normal.               Assessment & Plan:     58 y.o. female with the following -     ***    *** Scan 2/11/2016: colonoscopy, repeat pending pathology ([facility]).   No pathology scanned.  Will request records.          Return in about 3 months (around 2/27/2024) for alprazolam refill.    Please note that this dictation was created using voice recognition software. I have made every reasonable attempt to correct obvious errors, but I expect that there are errors of grammar and possibly content that I did not discover before finalizing the note.    "

## 2023-11-28 ENCOUNTER — APPOINTMENT (OUTPATIENT)
Dept: RESEARCH | Facility: MEDICAL CENTER | Age: 58
End: 2023-11-28
Attending: PHYSICIAN ASSISTANT
Payer: OTHER GOVERNMENT

## 2023-12-07 LAB
CALR EXON 9 MUT ANL BLD/T: NORMAL
CITATION REF LAB TEST: NORMAL
EPO SERPL-ACNC: 17 MIU/ML (ref 2.6–18.5)
FERRITIN SERPL-MCNC: 65 NG/ML (ref 15–150)
IRON SATN MFR SERPL: 26 % (ref 15–55)
IRON SERPL-MCNC: 86 UG/DL (ref 27–159)
JAK2 GENE MUT ANL BLD/T: NORMAL
LAB DIRECTOR NAME PROVIDER: NORMAL
MPL GENE MUT TESTED MAR: NORMAL
REF LAB TEST METHOD: NORMAL
REFLEX:: NORMAL
TEST PERFORMANCE INFO SPEC: NORMAL
TIBC SERPL-MCNC: 334 UG/DL (ref 250–450)
UIBC SERPL-MCNC: 248 UG/DL (ref 131–425)

## 2024-01-18 RX ORDER — OXCARBAZEPINE 300 MG/1
TABLET, FILM COATED ORAL
Qty: 60 TABLET | Status: CANCELLED | OUTPATIENT
Start: 2024-01-18

## 2024-01-18 NOTE — TELEPHONE ENCOUNTER
Received request via: Patient- has 2 days left    Was the patient seen in the last year in this department? Yes    Does the patient have an active prescription (recently filled or refills available) for medication(s) requested? No    Does the patient have shelter Plus and need 100 day supply (blood pressure, diabetes and cholesterol meds only)? Patient does not have SCP

## 2024-01-20 RX ORDER — OXCARBAZEPINE 300 MG/1
300 TABLET, FILM COATED ORAL 2 TIMES DAILY
Qty: 180 TABLET | Refills: 3 | Status: SHIPPED | OUTPATIENT
Start: 2024-01-20

## 2024-02-21 RX ORDER — LIDOCAINE HYDROCHLORIDE 20 MG/ML
SOLUTION OROPHARYNGEAL
COMMUNITY
Start: 2023-12-14

## 2024-02-22 ENCOUNTER — OFFICE VISIT (OUTPATIENT)
Dept: MEDICAL GROUP | Facility: PHYSICIAN GROUP | Age: 59
End: 2024-02-22
Payer: OTHER GOVERNMENT

## 2024-02-22 VITALS
HEIGHT: 66 IN | SYSTOLIC BLOOD PRESSURE: 120 MMHG | TEMPERATURE: 97.4 F | RESPIRATION RATE: 16 BRPM | WEIGHT: 155.8 LBS | BODY MASS INDEX: 25.04 KG/M2 | HEART RATE: 69 BPM | OXYGEN SATURATION: 94 % | DIASTOLIC BLOOD PRESSURE: 80 MMHG

## 2024-02-22 DIAGNOSIS — Z86.19 HISTORY OF EPSTEIN-BARR VIRUS INFECTION: ICD-10-CM

## 2024-02-22 DIAGNOSIS — F11.20 OPIATE DEPENDENCE, CONTINUOUS (HCC): ICD-10-CM

## 2024-02-22 DIAGNOSIS — N95.1 MENOPAUSAL SYMPTOMS: ICD-10-CM

## 2024-02-22 DIAGNOSIS — I10 ESSENTIAL HYPERTENSION: ICD-10-CM

## 2024-02-22 DIAGNOSIS — Z79.899 CHRONIC USE OF BENZODIAZEPINE FOR THERAPEUTIC PURPOSE: ICD-10-CM

## 2024-02-22 DIAGNOSIS — D75.1 POLYCYTHEMIA: ICD-10-CM

## 2024-02-22 DIAGNOSIS — M25.50 ARTHRALGIA, UNSPECIFIED JOINT: ICD-10-CM

## 2024-02-22 DIAGNOSIS — E78.5 DYSLIPIDEMIA: ICD-10-CM

## 2024-02-22 DIAGNOSIS — F17.210 CIGARETTE SMOKER: ICD-10-CM

## 2024-02-22 DIAGNOSIS — Z91.89 AT HIGH RISK FOR BREAST CANCER: ICD-10-CM

## 2024-02-22 DIAGNOSIS — G47.09 OTHER INSOMNIA: ICD-10-CM

## 2024-02-22 DIAGNOSIS — R53.83 FATIGUE, UNSPECIFIED TYPE: ICD-10-CM

## 2024-02-22 DIAGNOSIS — G89.4 CHRONIC PAIN SYNDROME: ICD-10-CM

## 2024-02-22 DIAGNOSIS — Z12.31 ENCOUNTER FOR SCREENING MAMMOGRAM FOR MALIGNANT NEOPLASM OF BREAST: ICD-10-CM

## 2024-02-22 DIAGNOSIS — E55.9 VITAMIN D DEFICIENCY: ICD-10-CM

## 2024-02-22 DIAGNOSIS — Z79.890 HORMONE REPLACEMENT THERAPY (POSTMENOPAUSAL): ICD-10-CM

## 2024-02-22 DIAGNOSIS — G47.00 INSOMNIA, UNSPECIFIED TYPE: ICD-10-CM

## 2024-02-22 DIAGNOSIS — E78.49 OTHER HYPERLIPIDEMIA: ICD-10-CM

## 2024-02-22 DIAGNOSIS — Z23 NEED FOR VACCINATION: ICD-10-CM

## 2024-02-22 DIAGNOSIS — F41.9 ANXIETY DISORDER, UNSPECIFIED TYPE: ICD-10-CM

## 2024-02-22 PROCEDURE — 90715 TDAP VACCINE 7 YRS/> IM: CPT | Performed by: PHYSICIAN ASSISTANT

## 2024-02-22 PROCEDURE — 99214 OFFICE O/P EST MOD 30 MIN: CPT | Mod: 25 | Performed by: PHYSICIAN ASSISTANT

## 2024-02-22 PROCEDURE — 3074F SYST BP LT 130 MM HG: CPT | Performed by: PHYSICIAN ASSISTANT

## 2024-02-22 PROCEDURE — 3079F DIAST BP 80-89 MM HG: CPT | Performed by: PHYSICIAN ASSISTANT

## 2024-02-22 PROCEDURE — 90471 IMMUNIZATION ADMIN: CPT | Performed by: PHYSICIAN ASSISTANT

## 2024-02-22 RX ORDER — ESTRADIOL 2 MG/1
1 TABLET ORAL DAILY
Qty: 45 TABLET | Refills: 3 | Status: SHIPPED
Start: 2024-02-22

## 2024-02-22 RX ORDER — PROPRANOLOL HYDROCHLORIDE 10 MG/1
10 TABLET ORAL DAILY
Qty: 90 TABLET | Refills: 3 | Status: SHIPPED | OUTPATIENT
Start: 2024-02-22

## 2024-02-22 RX ORDER — OXYCODONE HYDROCHLORIDE 20 MG/1
TABLET ORAL
COMMUNITY
Start: 2024-02-15

## 2024-02-22 RX ORDER — BUPRENORPHINE HYDROCHLORIDE 900 UG/1
FILM, SOLUBLE BUCCAL
COMMUNITY
Start: 2024-02-08

## 2024-02-22 RX ORDER — ALPRAZOLAM 0.5 MG/1
0.5 TABLET ORAL 2 TIMES DAILY PRN
Qty: 135 TABLET | Refills: 0 | Status: SHIPPED
Start: 2024-02-22 | End: 2024-02-22

## 2024-02-22 RX ORDER — ROSUVASTATIN CALCIUM 10 MG/1
TABLET, COATED ORAL
Qty: 90 TABLET | Refills: 3 | Status: SHIPPED
Start: 2024-02-22

## 2024-02-22 RX ORDER — DULOXETIN HYDROCHLORIDE 30 MG/1
30 CAPSULE, DELAYED RELEASE ORAL DAILY
Qty: 90 CAPSULE | Refills: 3 | Status: SHIPPED
Start: 2024-02-22

## 2024-02-22 RX ORDER — ALPRAZOLAM 0.5 MG/1
0.5 TABLET ORAL 2 TIMES DAILY PRN
Qty: 135 TABLET | Refills: 1 | Status: SHIPPED | OUTPATIENT
Start: 2024-02-22 | End: 2024-02-28 | Stop reason: SDUPTHER

## 2024-02-22 ASSESSMENT — ENCOUNTER SYMPTOMS
SHORTNESS OF BREATH: 0
CHILLS: 0
FEVER: 0

## 2024-02-22 ASSESSMENT — FIBROSIS 4 INDEX: FIB4 SCORE: 0.82

## 2024-02-22 NOTE — ASSESSMENT & PLAN NOTE
Chronic, uncontrolled but improving.  Starting to feel better after restarting 1 mg daily.  Emphasized, again, using the lowest effective dose with an ultimate goal of discontinuing altogether given her continued smoking.  Understands risks of smoking with HRT.

## 2024-02-22 NOTE — ASSESSMENT & PLAN NOTE
Chronic, controlled.  Tolerating/continue cymbalta 30mg daily.    Tolerating/continue alprazolam 0.5mg every night and sometimes during the day for anxiety, maybe twice/week.    Also on oxycodone 10mg, #120/month from [facility].  Understands risks of opioid therapy with benzodiazepine use.

## 2024-02-22 NOTE — PROGRESS NOTES
SUBJECTIVE:     CC: Follow-up on chronic issues, medication refills    HPI:   Christelle presents today with the following:      ASSESSMENT & PLAN by Problem:       Problem List Items Addressed This Visit       Anxiety disorder, unspecified     Chronic, controlled.  Tolerating/continue cymbalta 30mg daily.    Tolerating/continue alprazolam 0.5mg every night and sometimes during the day for anxiety, maybe twice/week.    Also on oxycodone 10mg, #120/month from [facility].  Understands risks of opioid therapy with benzodiazepine use.          Relevant Medications    DULoxetine (CYMBALTA) 30 MG Cap DR Particles    ALPRAZolam (XANAX) 0.5 MG Tab    At high risk for breast cancer    Relevant Orders    MA-SCREENING MAMMO BILAT W/TOMOSYNTHESIS W/CAD    Chronic pain syndrome     Chronic, controlled.  Tolerating/continue duloxetine 30 mg daily.  On chronic pain medication through [facility].         Relevant Medications    BELBUCA 900 MCG FILM    Oxycodone HCl 20 MG Tab    DULoxetine (CYMBALTA) 30 MG Cap DR Particles    Chronic use of benzodiazepine for therapeutic purpose     UDS 8/9/2023 consistent with use.  Consent signed 11/1/2023.         Cigarette smoker     Chronic, uncontrolled.  Patient is not ready quit.  Started smoking when she was 13.  Smokes less than 1 pack/day.  Understands the risks of smoking with HRT.            Dyslipidemia     Chronic, stable.  Continue rosuvastatin 10 mg daily.           Relevant Orders    Lipid Profile    Essential hypertension     Chronic, stable.  Continue propranolol 10 mg daily.         Relevant Medications    propranolol (INDERAL) 10 MG Tab    rosuvastatin (CRESTOR) 10 MG Tab    Hormone replacement therapy (postmenopausal)     Chronic, uncontrolled but improving.  Starting to feel better after restarting 1 mg daily.  Emphasized, again, using the lowest effective dose with an ultimate goal of discontinuing altogether given her continued smoking.  Understands risks  of smoking with HRT.          Relevant Medications    estradiol (ESTRACE) 2 MG Tab    Joint pain    Opiate dependence, continuous (HCC)    Other insomnia     Chronic, stable.  Continue alprazolam 0.5 mg nightly.           Polycythemia     Chronic, uncontrolled.  Likely secondary to cigarette smoking and untreated sleep apnea.  Negative lab workup.  Declines further evaluation for sleep apnea.         Vitamin D deficiency     Chronic, stable.  Continue 1 over-the-counter vitamin D supplement daily.         Relevant Orders    VITAMIN D,25 HYDROXY (DEFICIENCY)     Other Visit Diagnoses       Need for vaccination        Relevant Orders    Tdap =>6yo IM (Completed)    Other hyperlipidemia        Relevant Medications    propranolol (INDERAL) 10 MG Tab    rosuvastatin (CRESTOR) 10 MG Tab    Menopausal symptoms        Relevant Medications    estradiol (ESTRACE) 2 MG Tab    History of Neeraj-Barr virus infection        Insomnia, unspecified type        Relevant Medications    ALPRAZolam (XANAX) 0.5 MG Tab    Encounter for screening mammogram for malignant neoplasm of breast        Relevant Orders    MA-SCREENING MAMMO BILAT W/TOMOSYNTHESIS W/CAD    Fatigue, unspecified type        Relevant Orders    CBC WITH DIFFERENTIAL    Comp Metabolic Panel    TSH WITH REFLEX TO FT4              Return in about 6 months (around 8/22/2024) for med check, lab discussion, medication refill.      PDMP review shows no signs of medication abuse or misuse.  History of opioid/benzodiazepine dosing, 3/2022, Belbuca 600 mg # 180, oxycodone 10 mg, #120, alprazolam 0.5 mg, #60.    Overall dosing has decreased and patient continues to tolerate the medication without issue.      HPI:     Problem   Polycythemia    Chronic, uncontrolled.  Likely secondary to cigarette smoking.  Negative lab workup.    Also has known sleep apnea that is untreated.  Patient politely declines any further work-up for sleep apnea as she does not want to wear CPAP and is  unable to tolerate the nightguard.         Other Insomnia    Chronic, uncontrolled.  Uses alprazolam 0.5mg every night and sometimes during the day for anxiety, maybe twice/week.  Has been on alprazolam for years.        Cigarette Smoker    Chronic, uncontrolled.  Patient is not ready quit.  Started smoking when she was 13.  Smokes less than 1 pack/day.  Understands the risks of smoking with HRT.        Chronic Use of Benzodiazepine for Therapeutic Purpose    UDS 8/9/2023 consistent with use.  Consent signed 11/1/2023.     At High Risk for Breast Cancer    Reports has annual mammogram and MRI at [facility]  Reports BRCA gene, no records available.    Referring to [facility] [place] [facility].         Essential Hypertension    Chronic, uncontrolled.  Tolerating propranolol 10 mg once daily.  182/90 in clinic, which was a repeat blood pressure.    Patient to monitor at home and bring her blood pressure cuff at follow-up visit in about a month.      Hormone Replacement Therapy (Postmenopausal)    Chronic, uncontrolled.  Started estradiol due to night sweats and mood changes.    Started it around 2018.  Understands risks of smoking with HRT.  Weaned from 2mg to nothing over 6 weeks, off for 60 days: hot flashes, night sweats, emotional volatility.  Restarted 1mg. Starting to feel better.  While on 0.5mg, noticed hot flashes and emotional changes.    Interval history:  Report has been on estradiol 2mg daily orally x 4 years.   Discussed risk of clots in setting of smoking  Increase breast cancer risk due   Allergic to adhesive/ cannot take estrogen patch  May consider tapering off at 5 year         Chronic Pain Syndrome    Chronic, controlled.  Oxycodone 10mg q 6 hours.  Belbuca 600mg q 12 hours.  Was on oxy 10mg q 4 hours --> Belbuca has allowed her to come down on her oxycodone dosing.    Managed by [facility].    cymbalta 30mg: pain and anxiety.    Interval history:  - Chronic pain since age 30s,  "muscle aches, report history of EBV, intermittent pain in joints, history of cluster migraine previously evaluated reported in [place], on disability.  - take acyclovir 800mg TID x 7-10 days for 1-2 course during joint flares.  - currently following with [facility]       Anxiety Disorder, Unspecified    Chronic, controlled.  Tolerating cymbalta 30mg daily for maybe a year or so.  Feels better after starting oxcarbazepine (for the burning in her mouth).    Uses alprazolam 0.5mg every night and sometimes during the day for anxiety, maybe twice/week.    Also on oxycodone 10mg, #120/month from [facility].  Understands risks of opioid therapy with benzodiazepine use.      Opiate Dependence, Continuous (Hcc)    Chronic, stable.  Chronic pain is managed by [facility].  Oxycodone 10 mg every 6 hours and Belbuca 600 mg twice daily.    UDS 8/9/2023 consistent with use        Vitamin D Deficiency    Chronic, stable.  Currently taking vitamin D supplementation.  She is unsure of the dose.     Dyslipidemia    Chronic, controlled.     11/2023:      Previous Labs:   Lab Results   Component Value Date/Time    CHOLSTRLTOT 159 11/17/2023 08:33 AM    CHOLSTRLTOT 233 (H) 07/29/2021 05:25 PM     (H) 07/29/2021 05:25 PM    HDL 64 11/17/2023 08:33 AM    HDL 68 07/29/2021 05:25 PM    TRIGLYCERIDE 90 11/17/2023 08:33 AM    TRIGLYCERIDE 136 07/29/2021 05:25 PM      Medications: rosuvastatin 10 mg    Risk calculator: The 10-year ASCVD risk score ([place] DK, et al., 2019) is: 5.6%                   ROS:  Review of Systems   Constitutional:  Negative for chills and fever.   Respiratory:  Negative for shortness of breath.    Cardiovascular:  Negative for chest pain.       OBJECTIVE:     Exam:  /80 (BP Location: Left arm, Patient Position: Sitting, BP Cuff Size: Adult)   Pulse 69   Temp 36.3 °C (97.4 °F) (Temporal)   Resp 16   Ht 1.676 m (5' 6\")   Wt 70.7 kg (155 lb 12.8 oz)   SpO2 94%   BMI 25.15 " kg/m²  Body mass index is 25.15 kg/m².    Physical Exam  Vitals reviewed.   Constitutional:       General: She is not in acute distress.     Appearance: Normal appearance.   Pulmonary:      Effort: Pulmonary effort is normal.   Neurological:      General: No focal deficit present.      Mental Status: She is alert.   Psychiatric:         Mood and Affect: Mood normal.         Behavior: Behavior normal.         Judgment: Judgment normal.             Please note that this dictation was created using voice recognition software. I have made every reasonable attempt to correct obvious errors, but I expect that there are errors of grammar and possibly content that I did not discover before finalizing the note.

## 2024-02-22 NOTE — ASSESSMENT & PLAN NOTE
Chronic, uncontrolled.  Likely secondary to cigarette smoking and untreated sleep apnea.  Negative lab workup.  Declines further evaluation for sleep apnea.

## 2024-02-22 NOTE — ASSESSMENT & PLAN NOTE
Chronic, uncontrolled.  Patient is not ready quit.  Started smoking when she was 13.  Smokes less than 1 pack/day.  Understands the risks of smoking with HRT.

## 2024-02-22 NOTE — ASSESSMENT & PLAN NOTE
Chronic, controlled.  Tolerating/continue duloxetine 30 mg daily.  On chronic pain medication through [facility].

## 2024-02-27 DIAGNOSIS — F41.9 ANXIETY DISORDER, UNSPECIFIED TYPE: ICD-10-CM

## 2024-02-27 DIAGNOSIS — G47.00 INSOMNIA, UNSPECIFIED TYPE: ICD-10-CM

## 2024-02-27 RX ORDER — ALPRAZOLAM 0.5 MG/1
0.5 TABLET ORAL 2 TIMES DAILY PRN
Qty: 135 TABLET | Refills: 1 | Status: CANCELLED | OUTPATIENT
Start: 2024-02-27 | End: 2024-05-27

## 2024-02-27 NOTE — TELEPHONE ENCOUNTER
Received request via: Patient    Was the patient seen in the last year in this department? Yes    Does the patient have an active prescription (recently filled or refills available) for medication(s) requested? No    Pharmacy Name: [facility] are now able to be sent     Does the patient have [facility] and need 100 day supply (blood pressure, diabetes and cholesterol meds only)? Patient does not have SCP

## 2024-02-28 DIAGNOSIS — G47.00 INSOMNIA, UNSPECIFIED TYPE: ICD-10-CM

## 2024-02-28 DIAGNOSIS — F41.9 ANXIETY DISORDER, UNSPECIFIED TYPE: ICD-10-CM

## 2024-02-28 RX ORDER — ALPRAZOLAM 0.5 MG/1
0.5 TABLET ORAL 2 TIMES DAILY PRN
Qty: 135 TABLET | Refills: 1 | Status: SHIPPED | OUTPATIENT
Start: 2024-02-28 | End: 2024-05-28

## 2024-04-18 NOTE — TELEPHONE ENCOUNTER
Received request via: Pharmacy- Pharmacy change to [facility]     Was the patient seen in the last year in this department? Yes    Does the patient have an active prescription (recently filled or refills available) for medication(s) requested? No    Does the patient have longterm Plus and need 100 day supply (blood pressure, diabetes and cholesterol meds only)? Patient does not have SCP

## 2024-04-24 RX ORDER — OXCARBAZEPINE 300 MG/1
300 TABLET, FILM COATED ORAL 2 TIMES DAILY
Qty: 180 TABLET | Refills: 3 | Status: SHIPPED | OUTPATIENT
Start: 2024-04-24

## 2024-08-13 ENCOUNTER — APPOINTMENT (OUTPATIENT)
Dept: MEDICAL GROUP | Facility: PHYSICIAN GROUP | Age: 59
End: 2024-08-13
Payer: OTHER GOVERNMENT

## 2024-08-13 ENCOUNTER — HOSPITAL ENCOUNTER (OUTPATIENT)
Facility: MEDICAL CENTER | Age: 59
End: 2024-08-13
Attending: PHYSICIAN ASSISTANT
Payer: OTHER GOVERNMENT

## 2024-08-13 VITALS
SYSTOLIC BLOOD PRESSURE: 118 MMHG | RESPIRATION RATE: 16 BRPM | DIASTOLIC BLOOD PRESSURE: 57 MMHG | HEIGHT: 66 IN | TEMPERATURE: 97.8 F | OXYGEN SATURATION: 94 % | WEIGHT: 167 LBS | HEART RATE: 74 BPM | BODY MASS INDEX: 26.84 KG/M2

## 2024-08-13 DIAGNOSIS — G89.4 CHRONIC PAIN SYNDROME: ICD-10-CM

## 2024-08-13 DIAGNOSIS — Z79.890 HORMONE REPLACEMENT THERAPY (POSTMENOPAUSAL): ICD-10-CM

## 2024-08-13 DIAGNOSIS — F17.210 CIGARETTE SMOKER: ICD-10-CM

## 2024-08-13 DIAGNOSIS — F11.20 OPIATE DEPENDENCE, CONTINUOUS (HCC): ICD-10-CM

## 2024-08-13 DIAGNOSIS — Z79.899 CHRONIC USE OF BENZODIAZEPINE FOR THERAPEUTIC PURPOSE: ICD-10-CM

## 2024-08-13 DIAGNOSIS — F41.9 ANXIETY DISORDER, UNSPECIFIED TYPE: ICD-10-CM

## 2024-08-13 DIAGNOSIS — K14.6 BURNING MOUTH SYNDROME: ICD-10-CM

## 2024-08-13 DIAGNOSIS — E55.9 VITAMIN D DEFICIENCY: ICD-10-CM

## 2024-08-13 DIAGNOSIS — I10 ESSENTIAL HYPERTENSION: ICD-10-CM

## 2024-08-13 DIAGNOSIS — G47.39 OTHER SLEEP APNEA: ICD-10-CM

## 2024-08-13 DIAGNOSIS — G47.09 OTHER INSOMNIA: ICD-10-CM

## 2024-08-13 DIAGNOSIS — D75.1 POLYCYTHEMIA: ICD-10-CM

## 2024-08-13 PROCEDURE — 3074F SYST BP LT 130 MM HG: CPT | Performed by: PHYSICIAN ASSISTANT

## 2024-08-13 PROCEDURE — 3078F DIAST BP <80 MM HG: CPT | Performed by: PHYSICIAN ASSISTANT

## 2024-08-13 PROCEDURE — 99214 OFFICE O/P EST MOD 30 MIN: CPT | Performed by: PHYSICIAN ASSISTANT

## 2024-08-13 RX ORDER — ALPRAZOLAM 0.5 MG
0.5 TABLET ORAL EVERY 12 HOURS PRN
Qty: 135 TABLET | Refills: 0 | Status: SHIPPED | OUTPATIENT
Start: 2024-08-13 | End: 2024-11-11

## 2024-08-13 ASSESSMENT — ENCOUNTER SYMPTOMS
SHORTNESS OF BREATH: 0
CHILLS: 0
FEVER: 0

## 2024-08-13 ASSESSMENT — PATIENT HEALTH QUESTIONNAIRE - PHQ9: CLINICAL INTERPRETATION OF PHQ2 SCORE: 0

## 2024-08-13 ASSESSMENT — FIBROSIS 4 INDEX: FIB4 SCORE: 0.84

## 2024-08-13 NOTE — ASSESSMENT & PLAN NOTE
Chronic, uncontrolled.  Dental has been unable to figure out cause.  Pain management tried an injection in her throat --> it helped the first time, seemed to hit the nerve, until the lidocaine wore off BUT the next 2 injections didn't do anything.  Pain management is working on this.  Tried oxcarbazepine 300mg BID to see if it helps --> didn't help.

## 2024-08-13 NOTE — ASSESSMENT & PLAN NOTE
Chronic, stable.  Chronic pain is managed by [facility].  Oxycodone 10 mg every 6 hours and Belbuca 600 mg twice daily.    UDS 8/9/2023 consistent with use     Understands risks of opioid therapy with benzodiazepine use.

## 2024-08-13 NOTE — ASSESSMENT & PLAN NOTE
Chronic, controlled.   Tolerating/continue alprazolam 0.5mg at night and sometimes during the day for anxiety, maybe twice/week.  Has been on alprazolam for years.     Also on oxycodone 10mg, #120/month from [facility].  Understands risks of opioid therapy with benzodiazepine use.

## 2024-08-13 NOTE — ASSESSMENT & PLAN NOTE
Chronic, uncontrolled.  Was tolerating propranol 10mg daily but BP has been running low.  In clinic, without propranolol x 1 week: 118/57.

## 2024-08-13 NOTE — PROGRESS NOTES
SUBJECTIVE:     CC:     HPI:   Christelle presents today with the following:    ASSESSMENT & PLAN by Problem:       Problem List Items Addressed This Visit       Anxiety disorder, unspecified     Chronic, controlled.  Weaning cymbalta 30mg --> now down to 1 every 3 days.    Tolerating/continue alprazolam 0.5mg at night and sometimes during the day for anxiety, maybe twice/week.  Has been on alprazolam for years.          Relevant Medications    ALPRAZolam (XANAX) 0.5 MG Tab    Other Relevant Orders    Pain Management Screen    Burning mouth syndrome     Chronic, uncontrolled.  Dental has been unable to figure out cause.  Pain management tried an injection in her throat --> it helped the first time, seemed to hit the nerve, until the lidocaine wore off BUT the next 2 injections didn't do anything.  Pain management is working on this.  Tried oxcarbazepine 300mg BID to see if it helps --> didn't help.         Chronic pain syndrome     Chronic, controlled.  Oxycodone 10mg q 6 hours.  Belbuca 600mg q 12 hours.  Was on oxy 10mg q 4 hours --> Belbuca has allowed her to come down on her oxycodone dosing.    Managed by [facility].    Weaning cymbalta 30mg --> now down to 1 every 3 days.         Chronic use of benzodiazepine for therapeutic purpose     Due to repeat UDS.    Understands risks of opioid therapy with benzodiazepine use.          Relevant Medications    ALPRAZolam (XANAX) 0.5 MG Tab    Other Relevant Orders    Pain Management Screen    Cigarette smoker     Chronic, uncontrolled.  Patient is not ready quit.  Started smoking when she was 13.  Smokes less than 1 pack/day.  Understands the risks of smoking with HRT.            Essential hypertension     Chronic, uncontrolled.  Was tolerating propranol 10mg daily but BP has been running low.  In clinic, without propranolol x 1 week: 118/57.         Hormone replacement therapy (postmenopausal)     Chronic, controlled.   Started HRT around  2018.  Tolerating/continue estradiol 2 mg daily --> wants to try to cut back down again.  Tried weaning but had hot flashes and emotional changes (even with 0.5-1 mg daily).  Understands risks of smoking with HRT.         Opiate dependence, continuous (HCC)     Chronic, stable.  Chronic pain is managed by [facility].  Oxycodone 10 mg every 6 hours and Belbuca 600 mg twice daily.    UDS 8/9/2023 consistent with use     Understands risks of opioid therapy with benzodiazepine use.          Other insomnia     Chronic, controlled.   Tolerating/continue alprazolam 0.5mg at night and sometimes during the day for anxiety, maybe twice/week.  Has been on alprazolam for years.     Also on oxycodone 10mg, #120/month from [facility].  Understands risks of opioid therapy with benzodiazepine use.          Relevant Medications    ALPRAZolam (XANAX) 0.5 MG Tab    Other Relevant Orders    Pain Management Screen    Other sleep apnea     Chronic, uncontrolled.  Patient was using a mouthpiece but has not continued it as it is too tight in her mouth.  She declines further evaluation as she has no interest in wearing a CPAP.         Polycythemia     Chronic, uncontrolled.  Likely secondary to cigarette smoking.  Negative lab workup.    Also has known sleep apnea that is untreated.  Patient politely declines any further work-up for sleep apnea as she does not want to wear CPAP and is unable to tolerate the nightguard.    May need to consider therapeutic phlebotomy.          Relevant Orders    CBC WITH DIFFERENTIAL    Vitamin D deficiency     Chronic, stable.  Currently taking vitamin D supplementation.  She is unsure of the dose.            Outside labs, 8/2/2024: Creatinine 0.7, GFR 99    Repeat CBC around January 2025.    Follow-up in 6 months for medication refill.    PDMP review shows no signs of medication abuse or misuse:      Return for med refill/labs, 4-6 months..        HPI:     Problem   Polycythemia     Chronic, uncontrolled.  Likely secondary to cigarette smoking.  Negative lab workup.    Also has known sleep apnea that is untreated.  Patient politely declines any further work-up for sleep apnea as she does not want to wear CPAP and is unable to tolerate the nightguard.         Other Sleep Apnea    Chronic, uncontrolled.  Patient was using a mouthpiece but has not continued it as it is too tight in her mouth.  She declines further evaluation as she has no interest in wearing a CPAP.     Other Insomnia    Chronic, controlled.   Tolerating/continue alprazolam 0.5mg at night and sometimes during the day for anxiety, maybe twice/week.  Has been on alprazolam for years.     Also on oxycodone 10mg, #120/month from [facility].  Understands risks of opioid therapy with benzodiazepine use.     Chronic pain is managed by [facility].  Oxycodone 10 mg every 6 hours and Belbuca 600 mg twice daily.    UDS 8/9/2023 consistent with use     Most recent fill: 2/28/2024, alprazolam 0.5 mg, 135 tablets (in clinic 8/13/2024 for refill).       Cigarette Smoker    Chronic, uncontrolled.  Patient is not ready quit.  Started smoking when she was 13.  Smokes less than 1 pack/day.  Understands the risks of smoking with HRT.        Chronic Use of Benzodiazepine for Therapeutic Purpose    UDS 8/9/2023 consistent with use.  Consent signed 11/1/2023.     Understands risks of opioid therapy with benzodiazepine use.      Essential Hypertension    Chronic, uncontrolled.  Was tolerating propranol 10mg daily but BP has been running low.  In clinic, without propranolol x 1 week: 118/57.     Burning Mouth Syndrome    Chronic, uncontrolled.  Dental has been unable to figure out cause.  Pain management tried an injection in her throat --> it helped the first time, seemed to hit the nerve, until the lidocaine wore off BUT the next 2 injections didn't do anything.  Pain management is working on this.  Tried oxcarbazepine 300mg BID to  see if it helps --> didn't help.    Tolerates viscous lidocaine as needed.  Needs a prescription sent to [facility] but will send me a [facility] message on which [facility] that she wanted to go to.     Hormone Replacement Therapy (Postmenopausal)    Chronic, controlled.   Started HRT around 2018.  Tolerating/continue estradiol 2 mg daily --> wants to try to cut back down again.  Tried weaning but had hot flashes and emotional changes (even with 0.5-1 mg daily).  Understands risks of smoking with HRT.    Interval history:   Started estradiol due to night sweats and mood changes.    Started it around 2018.  Understands risks of smoking with HRT.  Weaned from 2mg to nothing over 6 weeks, off for 60 days: hot flashes, night sweats, emotional volatility.  Restarted 1mg. Starting to feel better.  While on 0.5mg, noticed hot flashes and emotional changes.         Chronic Pain Syndrome    Chronic, controlled.  Oxycodone 10mg q 6 hours.  Belbuca 600mg q 12 hours.  Was on oxy 10mg q 4 hours --> Belbuca has allowed her to come down on her oxycodone dosing.    Managed by [facility].    Weaning cymbalta 30mg --> now down to 1 every 3 days.    Interval history:  - Chronic pain since age 30s, muscle aches, report history of EBV, intermittent pain in joints, history of cluster migraine previously evaluated reported in [place], on disability.  - take acyclovir 800mg TID x 7-10 days for 1-2 course during joint flares.  - currently following with [facility]       Anxiety Disorder, Unspecified    Chronic, controlled.  Weaning cymbalta 30mg --> now down to 1 every 3 days.    Tolerating/continue alprazolam 0.5mg at night and sometimes during the day for anxiety, maybe twice/week.  Has been on alprazolam for years.     Also on oxycodone 10mg, #120/month from [facility].  Understands risks of opioid therapy with benzodiazepine use.     Chronic pain is managed by [facility].  Oxycodone 10 mg every 6 hours  "and Belbuca 600 mg twice daily.    Most recent fill: 2/28/2024, alprazolam 0.5 mg, 135 tablets (in clinic 8/13/2024 for refill).     Opiate Dependence, Continuous (Hcc)    Chronic, stable.  Chronic pain is managed by [facility].  Oxycodone 10 mg every 6 hours and Belbuca 600 mg twice daily.    UDS 8/9/2023 consistent with use     Understands risks of opioid therapy with benzodiazepine use.      Vitamin D Deficiency    Chronic, stable.  Currently taking vitamin D supplementation.  She is unsure of the dose.                ROS:  Review of Systems   Constitutional:  Negative for chills and fever.   Respiratory:  Negative for shortness of breath.    Cardiovascular:  Negative for chest pain.       OBJECTIVE:     Exam:  /57 (BP Location: Left arm, Patient Position: Sitting, BP Cuff Size: Adult)   Pulse 74   Temp 36.6 °C (97.8 °F) (Temporal)   Resp 16   Ht 1.676 m (5' 6\")   Wt 75.8 kg (167 lb)   SpO2 94%   BMI 26.95 kg/m²  Body mass index is 26.95 kg/m².    Physical Exam  Vitals reviewed.   Constitutional:       General: She is not in acute distress.     Appearance: Normal appearance.   Pulmonary:      Effort: Pulmonary effort is normal.   Neurological:      General: No focal deficit present.      Mental Status: She is alert.   Psychiatric:         Mood and Affect: Mood normal.         Behavior: Behavior normal.         Judgment: Judgment normal.             Please note that this dictation was created using voice recognition software. I have made every reasonable attempt to correct obvious errors, but I expect that there are errors of grammar and possibly content that I did not discover before finalizing the note.    "

## 2024-08-13 NOTE — ASSESSMENT & PLAN NOTE
Chronic, controlled.   Started HRT around 2018.  Tolerating/continue estradiol 2 mg daily --> wants to try to cut back down again.  Tried weaning but had hot flashes and emotional changes (even with 0.5-1 mg daily).  Understands risks of smoking with HRT.

## 2024-08-13 NOTE — ASSESSMENT & PLAN NOTE
Chronic, uncontrolled.  Likely secondary to cigarette smoking.  Negative lab workup.    Also has known sleep apnea that is untreated.  Patient politely declines any further work-up for sleep apnea as she does not want to wear CPAP and is unable to tolerate the nightguard.    May need to consider therapeutic phlebotomy.

## 2024-08-13 NOTE — ASSESSMENT & PLAN NOTE
Chronic, uncontrolled.  Patient was using a mouthpiece but has not continued it as it is too tight in her mouth.  She declines further evaluation as she has no interest in wearing a CPAP.

## 2024-08-13 NOTE — ASSESSMENT & PLAN NOTE
Chronic, controlled.  Weaning cymbalta 30mg --> now down to 1 every 3 days.    Tolerating/continue alprazolam 0.5mg at night and sometimes during the day for anxiety, maybe twice/week.  Has been on alprazolam for years.

## 2024-08-13 NOTE — ASSESSMENT & PLAN NOTE
Chronic, controlled.  Oxycodone 10mg q 6 hours.  Belbuca 600mg q 12 hours.  Was on oxy 10mg q 4 hours --> Belbuca has allowed her to come down on her oxycodone dosing.    Managed by [facility].    Weaning cymbalta 30mg --> now down to 1 every 3 days.

## 2024-08-14 ENCOUNTER — TELEPHONE (OUTPATIENT)
Dept: MEDICAL GROUP | Facility: PHYSICIAN GROUP | Age: 59
End: 2024-08-14
Payer: OTHER GOVERNMENT

## 2024-08-14 NOTE — TELEPHONE ENCOUNTER
Caller Name: Janene from [facility]  Call Back Number: [phone]    Janene calls stating that we need a recollect for urine for the pain management screening that was ordered. The urine cup spilt in the bag.

## 2024-08-20 DIAGNOSIS — E78.49 OTHER HYPERLIPIDEMIA: ICD-10-CM

## 2024-08-20 NOTE — TELEPHONE ENCOUNTER
Received request via: Pharmacy    Was the patient seen in the last year in this department? Yes    Does the patient have an active prescription (recently filled or refills available) for medication(s) requested? No    Does the patient have nursing home Plus and need 100-day supply? (This applies to ALL medications) Patient does not have SCP

## 2024-08-23 RX ORDER — ROSUVASTATIN CALCIUM 10 MG/1
TABLET, COATED ORAL
Qty: 90 TABLET | Refills: 0 | Status: SHIPPED | OUTPATIENT
Start: 2024-08-23

## 2024-10-28 DIAGNOSIS — G47.09 OTHER INSOMNIA: ICD-10-CM

## 2024-10-28 DIAGNOSIS — F41.9 ANXIETY DISORDER, UNSPECIFIED TYPE: ICD-10-CM

## 2024-10-28 DIAGNOSIS — Z79.899 CHRONIC USE OF BENZODIAZEPINE FOR THERAPEUTIC PURPOSE: ICD-10-CM

## 2024-10-29 RX ORDER — ALPRAZOLAM 0.5 MG
0.5 TABLET ORAL EVERY 12 HOURS PRN
Qty: 135 TABLET | Refills: 1 | Status: SHIPPED | OUTPATIENT
Start: 2024-10-29 | End: 2025-01-27

## 2025-02-04 ENCOUNTER — APPOINTMENT (OUTPATIENT)
Dept: MEDICAL GROUP | Facility: PHYSICIAN GROUP | Age: 60
End: 2025-02-04
Payer: OTHER GOVERNMENT

## 2025-02-12 LAB
25(OH)D3+25(OH)D2 SERPL-MCNC: 85.7 NG/ML (ref 30–100)
ALBUMIN SERPL-MCNC: 4.5 G/DL (ref 3.8–4.9)
ALP SERPL-CCNC: 64 IU/L (ref 44–121)
ALT SERPL-CCNC: 15 IU/L (ref 0–32)
AST SERPL-CCNC: 13 IU/L (ref 0–40)
BASOPHILS # BLD AUTO: 0.1 X10E3/UL (ref 0–0.2)
BASOPHILS NFR BLD AUTO: 1 %
BILIRUB SERPL-MCNC: 0.4 MG/DL (ref 0–1.2)
BUN SERPL-MCNC: 9 MG/DL (ref 8–27)
BUN/CREAT SERPL: 13 (ref 12–28)
CALCIUM SERPL-MCNC: 9.7 MG/DL (ref 8.7–10.3)
CHLORIDE SERPL-SCNC: 100 MMOL/L (ref 96–106)
CHOLEST SERPL-MCNC: 251 MG/DL (ref 100–199)
CO2 SERPL-SCNC: 25 MMOL/L (ref 20–29)
CREAT SERPL-MCNC: 0.71 MG/DL (ref 0.57–1)
EGFRCR SERPLBLD CKD-EPI 2021: 97 ML/MIN/1.73
EOSINOPHIL # BLD AUTO: 0.1 X10E3/UL (ref 0–0.4)
EOSINOPHIL NFR BLD AUTO: 1 %
ERYTHROCYTE [DISTWIDTH] IN BLOOD BY AUTOMATED COUNT: 12.8 % (ref 11.7–15.4)
GLOBULIN SER CALC-MCNC: 2.1 G/DL (ref 1.5–4.5)
GLUCOSE SERPL-MCNC: 86 MG/DL (ref 70–99)
HCT VFR BLD AUTO: 48.4 % (ref 34–46.6)
HDLC SERPL-MCNC: 62 MG/DL
HGB BLD-MCNC: 16.6 G/DL (ref 11.1–15.9)
IMM GRANULOCYTES # BLD AUTO: 0 X10E3/UL (ref 0–0.1)
IMM GRANULOCYTES NFR BLD AUTO: 0 %
IMMATURE CELLS  115398: ABNORMAL
LDL CALC COMMENT:: ABNORMAL
LDLC SERPL CALC-MCNC: 165 MG/DL (ref 0–99)
LYMPHOCYTES # BLD AUTO: 2.1 X10E3/UL (ref 0.7–3.1)
LYMPHOCYTES NFR BLD AUTO: 36 %
MCH RBC QN AUTO: 34 PG (ref 26.6–33)
MCHC RBC AUTO-ENTMCNC: 34.3 G/DL (ref 31.5–35.7)
MCV RBC AUTO: 99 FL (ref 79–97)
MONOCYTES # BLD AUTO: 0.3 X10E3/UL (ref 0.1–0.9)
MONOCYTES NFR BLD AUTO: 5 %
MORPHOLOGY BLD-IMP: ABNORMAL
NEUTROPHILS # BLD AUTO: 3.4 X10E3/UL (ref 1.4–7)
NEUTROPHILS NFR BLD AUTO: 57 %
NRBC BLD AUTO-RTO: ABNORMAL %
PLATELET # BLD AUTO: 318 X10E3/UL (ref 150–450)
POTASSIUM SERPL-SCNC: 4.7 MMOL/L (ref 3.5–5.2)
PROT SERPL-MCNC: 6.6 G/DL (ref 6–8.5)
RBC # BLD AUTO: 4.88 X10E6/UL (ref 3.77–5.28)
SODIUM SERPL-SCNC: 139 MMOL/L (ref 134–144)
TRIGL SERPL-MCNC: 134 MG/DL (ref 0–149)
TSH SERPL DL<=0.005 MIU/L-ACNC: 1.38 UIU/ML (ref 0.45–4.5)
VLDLC SERPL CALC-MCNC: 24 MG/DL (ref 5–40)
WBC # BLD AUTO: 6 X10E3/UL (ref 3.4–10.8)

## 2025-02-14 ENCOUNTER — RESULTS FOLLOW-UP (OUTPATIENT)
Dept: MEDICAL GROUP | Facility: PHYSICIAN GROUP | Age: 60
End: 2025-02-14
Payer: OTHER GOVERNMENT

## 2025-02-18 ENCOUNTER — OFFICE VISIT (OUTPATIENT)
Dept: MEDICAL GROUP | Facility: PHYSICIAN GROUP | Age: 60
End: 2025-02-18
Payer: OTHER GOVERNMENT

## 2025-02-18 ENCOUNTER — HOSPITAL ENCOUNTER (OUTPATIENT)
Facility: MEDICAL CENTER | Age: 60
End: 2025-02-18
Attending: PHYSICIAN ASSISTANT
Payer: OTHER GOVERNMENT

## 2025-02-18 VITALS
OXYGEN SATURATION: 98 % | DIASTOLIC BLOOD PRESSURE: 80 MMHG | HEART RATE: 78 BPM | HEIGHT: 66 IN | SYSTOLIC BLOOD PRESSURE: 132 MMHG | WEIGHT: 179 LBS | BODY MASS INDEX: 28.77 KG/M2 | TEMPERATURE: 97.4 F

## 2025-02-18 DIAGNOSIS — Z79.899 CHRONIC USE OF BENZODIAZEPINE FOR THERAPEUTIC PURPOSE: ICD-10-CM

## 2025-02-18 DIAGNOSIS — E78.5 DYSLIPIDEMIA: ICD-10-CM

## 2025-02-18 DIAGNOSIS — I10 ESSENTIAL HYPERTENSION: ICD-10-CM

## 2025-02-18 DIAGNOSIS — Z79.890 HORMONE REPLACEMENT THERAPY (POSTMENOPAUSAL): ICD-10-CM

## 2025-02-18 DIAGNOSIS — Z91.89 AT HIGH RISK FOR BREAST CANCER: ICD-10-CM

## 2025-02-18 DIAGNOSIS — G47.09 OTHER INSOMNIA: ICD-10-CM

## 2025-02-18 DIAGNOSIS — F11.20 OPIATE DEPENDENCE, CONTINUOUS (HCC): ICD-10-CM

## 2025-02-18 DIAGNOSIS — D75.1 POLYCYTHEMIA: ICD-10-CM

## 2025-02-18 DIAGNOSIS — F41.9 ANXIETY DISORDER, UNSPECIFIED TYPE: ICD-10-CM

## 2025-02-18 DIAGNOSIS — M79.10 MYALGIA DUE TO STATIN: ICD-10-CM

## 2025-02-18 DIAGNOSIS — R23.2 HOT FLASHES: ICD-10-CM

## 2025-02-18 DIAGNOSIS — Z79.891 LONG TERM PRESCRIPTION OPIATE USE: ICD-10-CM

## 2025-02-18 DIAGNOSIS — F17.210 CIGARETTE SMOKER: ICD-10-CM

## 2025-02-18 DIAGNOSIS — T46.6X5A MYALGIA DUE TO STATIN: ICD-10-CM

## 2025-02-18 PROCEDURE — 3075F SYST BP GE 130 - 139MM HG: CPT | Performed by: PHYSICIAN ASSISTANT

## 2025-02-18 PROCEDURE — G0482 DRUG TEST DEF 15-21 CLASSES: HCPCS

## 2025-02-18 PROCEDURE — 3079F DIAST BP 80-89 MM HG: CPT | Performed by: PHYSICIAN ASSISTANT

## 2025-02-18 PROCEDURE — 99214 OFFICE O/P EST MOD 30 MIN: CPT | Performed by: PHYSICIAN ASSISTANT

## 2025-02-18 RX ORDER — ALPRAZOLAM 0.5 MG
0.5 TABLET ORAL EVERY 12 HOURS PRN
Qty: 135 TABLET | Refills: 1 | Status: SHIPPED | OUTPATIENT
Start: 2025-02-18 | End: 2025-05-19

## 2025-02-18 RX ORDER — ESTRADIOL 1 MG/1
0.5 TABLET ORAL DAILY
Qty: 45 TABLET | Refills: 0 | Status: SHIPPED | OUTPATIENT
Start: 2025-02-18

## 2025-02-18 ASSESSMENT — FIBROSIS 4 INDEX: FIB4 SCORE: 0.63

## 2025-02-18 ASSESSMENT — ENCOUNTER SYMPTOMS
FEVER: 0
SHORTNESS OF BREATH: 0
CHILLS: 0

## 2025-02-18 NOTE — PROGRESS NOTES
SUBJECTIVE:     CC:     HPI:   Christelle presents today with the following:    ASSESSMENT & PLAN by Problem:     Problem   Myalgia Due to Statin    Myalgias from atorvastatin and rosuvastatin     Polycythemia    Chronic, uncontrolled.  Likely secondary to cigarette smoking/untreated JUANI.  Negative lab workup.     Other Insomnia    Chronic, controlled.   Tolerating/continue alprazolam 0.5mg at night and sometimes during the day for anxiety, maybe twice/week.  Has been on alprazolam for years.      Also on oxycodone 10mg, #120/month from [facility].  Understands risks of opioid therapy with benzodiazepine use.     Chronic pain is managed by [facility].  Oxycodone 10 mg every 6 hours and Belbuca 600 mg twice daily.    UDS 8/9/2023 consistent with use     Most recent fill: 2/28/2024, alprazolam 0.5 mg, 135 tablets (in clinic 8/13/2024 for refill).       Cigarette Smoker    Chronic, uncontrolled.   Patient is not ready quit.  Started smoking when she was 13.  Smokes less than 1 pack/day.  Understands the risks of smoking with HRT.         Chronic Use of Benzodiazepine for Therapeutic Purpose    UDS 8/9/2023 consistent with use.  Consent signed 11/1/2023.      Understands risks of opioid therapy with benzodiazepine use.      At High Risk for Breast Cancer    Reports has annual mammogram and MRI at [facility]  Reports BRCA gene, no records available.    Patient declines [facility] evaluation.     Essential Hypertension    Chronic, stable off medications.     Hormone Replacement Therapy (Postmenopausal)    Chronic, controlled.   Started HRT around 2018.  Tolerating/continue estradiol 1 mg tessie.  Will to try 0.5mg daily.  Understands risks of smoking with HRT.      Anxiety Disorder, Unspecified    Chronic, stable.  Did go back on duloxetine 30mg daily.  Feeling better.    Tolerating/continue alprazolam 0.5mg at night and sometimes during the day for anxiety, maybe twice/week. MAX 1.5  TABS DAILY.  Has been on alprazolam for years.     Also on oxycodone 10mg, #120/month and belbuca 900mcg film, both from [facility].  Understands risks of opioid therapy with benzodiazepine use.        Opiate Dependence, Continuous (Hcc)    Chronic, stable.   Chronic pain is managed by [facility].  Oxycodone 10 mg every 6 hours and Belbuca 600 mg twice daily.    UDS: collected 2/18/2025   Medication consent: signed 2/18/2025    Understands risks of opioid therapy with benzodiazepine use.      Long Term Prescription Opiate Use    UDS: collected 2/18/2025   Medication consent: signed 2/18/2025     Dyslipidemia    Chronic, uncontrolled.  Statin induced joint pain.  Will try red yeast rice.       Patient mentions:  Corns on both feet  Would like to see podiatrist  She doesn't need a referral    UDS: collected 2/18/2025   Medication consent: signed 2/18/2025    Follow up in 6 months for alprazolam refill.     Repeat lipid panel around August 2025. Otherwise repeat labs February 2026.    PDMP review shows no signs of medication abuse or misuse:      Return in about 6 months (around 8/18/2025) for alprazolam refill.        HPI:     Problem List Items Addressed This Visit       Anxiety disorder, unspecified    Chronic, stable.  Did go back on duloxetine 30mg daily.  Feeling better.    Interval history 8/2024:  Chronic, controlled.  Weaning cymbalta 30mg --> now down to 1 every 3 days.    Tolerating/continue alprazolam 0.5mg at night and sometimes during the day for anxiety, maybe twice/week.  Has been on alprazolam for years.     Also on oxycodone 10mg, #120/month from [facility].  Understands risks of opioid therapy with benzodiazepine use.     Chronic pain is managed by [facility].  Oxycodone 10 mg every 6 hours and Belbuca 600 mg twice daily.    Most recent fill: 2/28/2024, alprazolam 0.5 mg, 135 tablets (in clinic 8/13/2024 for refill).         Relevant Medications     ALPRAZolam (XANAX) 0.5 MG Tab    Other Relevant Orders    Pain Management Screen    At high risk for breast cancer    Reports has annual mammogram and MRI at [facility]  Reports BRCA gene, no records available.    Patient declines [facility] evaluation.             Chronic use of benzodiazepine for therapeutic purpose    Relevant Medications    ALPRAZolam (XANAX) 0.5 MG Tab    Other Relevant Orders    Pain Management Screen    Cigarette smoker    Chronic, uncontrolled.  Patient is not ready quit.  Started smoking when she was 13.  Smokes less than 1 pack/day.  Understands the risks of smoking with HRT.            Dyslipidemia    Chronic, uncontrolled.  Statin induced joint pain.  Will try red yeast rice.    Latest Labs:   Lab Results   Component Value Date/Time    CHOLSTRLTOT 251 (H) 02/11/2025 10:03 AM    CHOLSTRLTOT 233 (H) 07/29/2021 05:25 PM     (H) 07/29/2021 05:25 PM    HDL 62 02/11/2025 10:03 AM    HDL 68 07/29/2021 05:25 PM    TRIGLYCERIDE 134 02/11/2025 10:03 AM    TRIGLYCERIDE 136 07/29/2021 05:25 PM        Medications:  statin induced myalgias    Risk calculator: The 10-year ASCVD risk score (Jozef DK, et al., 2019) is: 6.5%            Relevant Orders    Lipid Profile    Essential hypertension    Currently controlled off medications.    Interval history   Chronic, uncontrolled.  Was tolerating propranol 10mg daily but BP has been running low.  In clinic, without propranolol x 1 week: 118/57.         Hormone replacement therapy (postmenopausal)    Chronic, controlled.   Started HRT around 2018.  Tolerating/continue estradiol 1 mg tessie.  Will to try 0.5mg daily.  Understands risks of smoking with HRT.     Interval history:   Started estradiol due to night sweats and mood changes.    Started it around 2018.  Understands risks of smoking with HRT.  Weaned from 2mg to nothing over 6 weeks, off for 60 days: hot flashes, night sweats, emotional volatility.  Restarted 1mg. Starting  "to feel better.  While on 0.5mg, noticed hot flashes and emotional changes.             Relevant Medications    estradiol (ESTRACE) 1 MG Tab    Long term prescription opiate use    Myalgia due to statin    Opiate dependence, continuous (HCC)    Other insomnia    Relevant Medications    ALPRAZolam (XANAX) 0.5 MG Tab    Polycythemia    Chronic, uncontrolled.  Likely secondary to cigarette smoking/untreated JUANI.  Negative lab workup.    Also has known sleep apnea that is untreated.  Patient politely declines any further work-up for sleep apnea as she does not want to wear CPAP and is unable to tolerate the nightguard.          Other Visit Diagnoses         Hot flashes        Relevant Medications    estradiol (ESTRACE) 1 MG Tab                   ROS:  Review of Systems   Constitutional:  Negative for chills and fever.   Respiratory:  Negative for shortness of breath.    Cardiovascular:  Negative for chest pain.       OBJECTIVE:     Exam:  /80 (BP Location: Left arm, Patient Position: Sitting, BP Cuff Size: Adult)   Pulse 78   Temp 36.3 °C (97.4 °F) (Temporal)   Ht 1.676 m (5' 6\")   Wt 81.2 kg (179 lb)   SpO2 98%   BMI 28.89 kg/m²  Body mass index is 28.89 kg/m².    Physical Exam  Vitals reviewed.   Constitutional:       General: She is not in acute distress.     Appearance: Normal appearance.   Pulmonary:      Effort: Pulmonary effort is normal.   Neurological:      General: No focal deficit present.      Mental Status: She is alert.   Psychiatric:         Mood and Affect: Mood normal.         Behavior: Behavior normal.         Judgment: Judgment normal.           CHART REVIEW:                             Please note that this dictation was created using voice recognition software. I have made every reasonable attempt to correct obvious errors, but I expect that there are errors of grammar and possibly content that I did not discover before finalizing the note.    "

## 2025-02-18 NOTE — ASSESSMENT & PLAN NOTE
Chronic, uncontrolled.  Statin induced joint pain.  Will try red yeast rice.    Latest Labs:   Lab Results   Component Value Date/Time    CHOLSTRLTOT 251 (H) 02/11/2025 10:03 AM    CHOLSTRLTOT 233 (H) 07/29/2021 05:25 PM     (H) 07/29/2021 05:25 PM    HDL 62 02/11/2025 10:03 AM    HDL 68 07/29/2021 05:25 PM    TRIGLYCERIDE 134 02/11/2025 10:03 AM    TRIGLYCERIDE 136 07/29/2021 05:25 PM        Medications:  statin induced myalgias    Risk calculator: The 10-year ASCVD risk score (Jozef DK, et al., 2019) is: 6.5%

## 2025-02-18 NOTE — ASSESSMENT & PLAN NOTE
Currently controlled off medications.    Interval history   Chronic, uncontrolled.  Was tolerating propranol 10mg daily but BP has been running low.  In clinic, without propranolol x 1 week: 118/57.

## 2025-02-19 NOTE — ASSESSMENT & PLAN NOTE
Chronic, uncontrolled.  Likely secondary to cigarette smoking/untreated JUANI.  Negative lab workup.    Also has known sleep apnea that is untreated.  Patient politely declines any further work-up for sleep apnea as she does not want to wear CPAP and is unable to tolerate the nightguard.

## 2025-02-19 NOTE — ASSESSMENT & PLAN NOTE
Chronic, controlled.   Started HRT around 2018.  Tolerating/continue estradiol 1 mg tessie.  Will to try 0.5mg daily.  Understands risks of smoking with HRT.     Interval history:   Started estradiol due to night sweats and mood changes.    Started it around 2018.  Understands risks of smoking with HRT.  Weaned from 2mg to nothing over 6 weeks, off for 60 days: hot flashes, night sweats, emotional volatility.  Restarted 1mg. Starting to feel better.  While on 0.5mg, noticed hot flashes and emotional changes.

## 2025-02-19 NOTE — ASSESSMENT & PLAN NOTE
Reports has annual mammogram and MRI at [facility]  Reports BRCA gene, no records available.    Patient declines [facility] evaluation.

## 2025-02-23 LAB
1OH-MIDAZOLAM UR QL SCN: NOT DETECTED
6MAM UR QL: NOT DETECTED
7AMINOCLONAZEPAM UR QL: NOT DETECTED
A-OH ALPRAZ UR QL: PRESENT
ALPRAZ UR QL: PRESENT
AMPHET UR QL SCN: NOT DETECTED
ANNOTATION COMMENT IMP: NORMAL
BARBITURATES UR QL: NEGATIVE
BUPRENORPHINE UR QL: PRESENT
BZE UR QL: NEGATIVE
CARBOXYTHC UR QL: NEGATIVE
CARISOPRODOL UR QL: NEGATIVE
CLONAZEPAM UR QL: NOT DETECTED
CODEINE UR QL: NOT DETECTED
CREAT UR-MCNC: 105.1 MG/DL (ref 20–400)
DIAZEPAM UR QL: NOT DETECTED
ETHYL GLUCURONIDE UR QL: NEGATIVE
FENTANYL UR QL: NOT DETECTED
GABAPENTIN UR QL CFM: NOT DETECTED
HYDROCODONE UR QL: NOT DETECTED
HYDROMORPHONE UR QL: NOT DETECTED
LORAZEPAM UR QL: NOT DETECTED
MDA UR QL: NOT DETECTED
MDEA UR QL: NOT DETECTED
MDMA UR QL: NOT DETECTED
ME-PHENIDATE UR QL: NOT DETECTED
METHADONE UR QL: NEGATIVE
METHAMPHET UR QL: NOT DETECTED
MIDAZOLAM UR QL SCN: NOT DETECTED
MORPHINE UR QL: NOT DETECTED
NALOXONE UR QL CFM: NOT DETECTED
NORBUPRENORPHINE UR QL CFM: PRESENT
NORDIAZEPAM UR QL: NOT DETECTED
NORFENTANYL UR QL: NOT DETECTED
NORHYDROCODONE UR QL CFM: NOT DETECTED
NORMEPERIDINE UR QL CFM: NOT DETECTED
NOROXYCODONE UR QL CFM: PRESENT
NOROXYMORPHONE UR QL SCN: PRESENT
OXAZEPAM UR QL: NOT DETECTED
OXYCODONE UR QL: PRESENT
OXYMORPHONE UR QL: PRESENT
PATHOLOGY STUDY: NORMAL
PCP UR QL: NEGATIVE
PHENTERMINE UR QL: NOT DETECTED
PREGABALIN UR QL CFM: NOT DETECTED
SERVICE CMNT-IMP: NORMAL
TAPENTADOL UR QL SCN: NOT DETECTED
TAPENTADOL UR QL SCN: NOT DETECTED
TEMAZEPAM UR QL: NOT DETECTED
TRAMADOL UR QL: NEGATIVE
ZOLPIDEM PHENYL-4-CARB UR QL SCN: NOT DETECTED
ZOLPIDEM UR QL: NOT DETECTED

## 2025-02-27 ENCOUNTER — RESULTS FOLLOW-UP (OUTPATIENT)
Dept: MEDICAL GROUP | Facility: PHYSICIAN GROUP | Age: 60
End: 2025-02-27
Payer: OTHER GOVERNMENT

## 2025-07-31 DIAGNOSIS — E55.9 VITAMIN D DEFICIENCY: Primary | ICD-10-CM

## 2025-07-31 DIAGNOSIS — R53.83 FATIGUE, UNSPECIFIED TYPE: ICD-10-CM

## 2025-08-08 LAB
25(OH)D3+25(OH)D2 SERPL-MCNC: 62.5 NG/ML (ref 30–100)
BASOPHILS # BLD AUTO: 0.1 X10E3/UL (ref 0–0.2)
BASOPHILS NFR BLD AUTO: 1 %
EOSINOPHIL # BLD AUTO: 0.1 X10E3/UL (ref 0–0.4)
EOSINOPHIL NFR BLD AUTO: 1 %
ERYTHROCYTE [DISTWIDTH] IN BLOOD BY AUTOMATED COUNT: 13.6 % (ref 11.7–15.4)
HCT VFR BLD AUTO: 51.3 % (ref 34–46.6)
HGB BLD-MCNC: 17.2 G/DL (ref 11.1–15.9)
IMM GRANULOCYTES # BLD AUTO: 0 X10E3/UL (ref 0–0.1)
IMM GRANULOCYTES NFR BLD AUTO: 0 %
IMMATURE CELLS  115398: ABNORMAL
LYMPHOCYTES # BLD AUTO: 2 X10E3/UL (ref 0.7–3.1)
LYMPHOCYTES NFR BLD AUTO: 31 %
MCH RBC QN AUTO: 33.8 PG (ref 26.6–33)
MCHC RBC AUTO-ENTMCNC: 33.5 G/DL (ref 31.5–35.7)
MCV RBC AUTO: 101 FL (ref 79–97)
MONOCYTES # BLD AUTO: 0.4 X10E3/UL (ref 0.1–0.9)
MONOCYTES NFR BLD AUTO: 6 %
MORPHOLOGY BLD-IMP: ABNORMAL
NEUTROPHILS # BLD AUTO: 3.8 X10E3/UL (ref 1.4–7)
NEUTROPHILS NFR BLD AUTO: 61 %
NRBC BLD AUTO-RTO: ABNORMAL %
PLATELET # BLD AUTO: 347 X10E3/UL (ref 150–450)
RBC # BLD AUTO: 5.09 X10E6/UL (ref 3.77–5.28)
TSH SERPL DL<=0.005 MIU/L-ACNC: 1.28 UIU/ML (ref 0.45–4.5)
WBC # BLD AUTO: 6.3 X10E3/UL (ref 3.4–10.8)

## 2025-08-19 ENCOUNTER — OFFICE VISIT (OUTPATIENT)
Dept: MEDICAL GROUP | Facility: PHYSICIAN GROUP | Age: 60
End: 2025-08-19
Payer: OTHER GOVERNMENT

## 2025-08-19 VITALS
DIASTOLIC BLOOD PRESSURE: 74 MMHG | BODY MASS INDEX: 27.4 KG/M2 | WEIGHT: 170.5 LBS | SYSTOLIC BLOOD PRESSURE: 146 MMHG | RESPIRATION RATE: 10 BRPM | OXYGEN SATURATION: 95 % | TEMPERATURE: 98.1 F | HEIGHT: 66 IN | HEART RATE: 71 BPM

## 2025-08-19 DIAGNOSIS — L98.9 SKIN LESION: ICD-10-CM

## 2025-08-19 DIAGNOSIS — G47.09 OTHER INSOMNIA: ICD-10-CM

## 2025-08-19 DIAGNOSIS — Z79.899 CHRONIC USE OF BENZODIAZEPINE FOR THERAPEUTIC PURPOSE: ICD-10-CM

## 2025-08-19 DIAGNOSIS — F41.9 ANXIETY DISORDER, UNSPECIFIED TYPE: ICD-10-CM

## 2025-08-19 DIAGNOSIS — G47.39 OTHER SLEEP APNEA: ICD-10-CM

## 2025-08-19 DIAGNOSIS — E55.9 VITAMIN D DEFICIENCY: Primary | ICD-10-CM

## 2025-08-19 DIAGNOSIS — K14.6 BURNING MOUTH SYNDROME: ICD-10-CM

## 2025-08-19 DIAGNOSIS — D75.1 POLYCYTHEMIA: ICD-10-CM

## 2025-08-19 DIAGNOSIS — G89.4 CHRONIC PAIN SYNDROME: ICD-10-CM

## 2025-08-19 DIAGNOSIS — F11.20 OPIATE DEPENDENCE, CONTINUOUS (HCC): ICD-10-CM

## 2025-08-19 DIAGNOSIS — Z79.890 HORMONE REPLACEMENT THERAPY (POSTMENOPAUSAL): ICD-10-CM

## 2025-08-19 DIAGNOSIS — R73.01 IFG (IMPAIRED FASTING GLUCOSE): ICD-10-CM

## 2025-08-19 DIAGNOSIS — I10 ESSENTIAL HYPERTENSION: ICD-10-CM

## 2025-08-19 DIAGNOSIS — F17.210 CIGARETTE SMOKER: ICD-10-CM

## 2025-08-19 DIAGNOSIS — Z79.891 LONG TERM PRESCRIPTION OPIATE USE: ICD-10-CM

## 2025-08-19 DIAGNOSIS — R53.83 FATIGUE, UNSPECIFIED TYPE: ICD-10-CM

## 2025-08-19 DIAGNOSIS — Z12.31 ENCOUNTER FOR SCREENING MAMMOGRAM FOR MALIGNANT NEOPLASM OF BREAST: ICD-10-CM

## 2025-08-19 DIAGNOSIS — E78.5 DYSLIPIDEMIA: ICD-10-CM

## 2025-08-19 PROCEDURE — 3077F SYST BP >= 140 MM HG: CPT | Performed by: PHYSICIAN ASSISTANT

## 2025-08-19 PROCEDURE — 3078F DIAST BP <80 MM HG: CPT | Performed by: PHYSICIAN ASSISTANT

## 2025-08-19 PROCEDURE — 99215 OFFICE O/P EST HI 40 MIN: CPT | Performed by: PHYSICIAN ASSISTANT

## 2025-08-19 RX ORDER — METOCLOPRAMIDE 5 MG/1
TABLET ORAL
COMMUNITY

## 2025-08-19 RX ORDER — DIAZEPAM 10 MG/1
TABLET ORAL
COMMUNITY
End: 2025-08-19

## 2025-08-19 RX ORDER — IBUPROFEN 800 MG/1
TABLET, FILM COATED ORAL
COMMUNITY
Start: 2024-09-26

## 2025-08-19 RX ORDER — ALPRAZOLAM 0.5 MG
0.5 TABLET ORAL EVERY 12 HOURS PRN
Qty: 135 TABLET | Refills: 1 | Status: SHIPPED | OUTPATIENT
Start: 2025-08-19 | End: 2026-02-15

## 2025-08-19 RX ORDER — FROVATRIPTAN SUCCINATE 2.5 MG/1
TABLET, FILM COATED ORAL
COMMUNITY
End: 2025-08-19

## 2025-08-19 RX ORDER — MELOXICAM 15 MG/1
15 TABLET ORAL
COMMUNITY
End: 2025-08-19

## 2025-08-19 RX ORDER — ALPRAZOLAM 0.5 MG
TABLET ORAL
COMMUNITY
Start: 2025-05-27 | End: 2025-08-19

## 2025-08-19 RX ORDER — OXYCODONE HYDROCHLORIDE 10 MG/1
TABLET ORAL
COMMUNITY
Start: 2025-07-22

## 2025-08-19 RX ORDER — OXYCODONE HYDROCHLORIDE 15 MG/1
TABLET ORAL
COMMUNITY

## 2025-08-19 ASSESSMENT — ENCOUNTER SYMPTOMS
CHILLS: 0
SHORTNESS OF BREATH: 0
FEVER: 0

## 2025-08-19 ASSESSMENT — FIBROSIS 4 INDEX: FIB4 SCORE: 0.59

## 2025-08-19 ASSESSMENT — PATIENT HEALTH QUESTIONNAIRE - PHQ9: CLINICAL INTERPRETATION OF PHQ2 SCORE: 0
